# Patient Record
Sex: MALE | Race: WHITE | NOT HISPANIC OR LATINO | Employment: STUDENT | URBAN - METROPOLITAN AREA
[De-identification: names, ages, dates, MRNs, and addresses within clinical notes are randomized per-mention and may not be internally consistent; named-entity substitution may affect disease eponyms.]

---
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
## 2022-02-11 ENCOUNTER — HOSPITAL ENCOUNTER (OUTPATIENT)
Facility: HOSPITAL | Age: 22
Discharge: HOME | End: 2022-02-12
Attending: FAMILY MEDICINE | Admitting: INTERNAL MEDICINE
Payer: MEDICAID

## 2022-02-11 DIAGNOSIS — J90 PLEURAL EFFUSION: ICD-10-CM

## 2022-02-11 DIAGNOSIS — R07.9 CHEST PAIN: ICD-10-CM

## 2022-02-11 DIAGNOSIS — J90 PLEURAL EFFUSION ON RIGHT: Primary | ICD-10-CM

## 2022-02-11 LAB
ALBUMIN SERPL BCP-MCNC: 4 G/DL (ref 3.5–5.2)
ALP SERPL-CCNC: 71 U/L (ref 55–135)
ALT SERPL W/O P-5'-P-CCNC: 22 U/L (ref 10–44)
ANION GAP SERPL CALC-SCNC: 10 MMOL/L (ref 8–16)
AST SERPL-CCNC: 27 U/L (ref 10–40)
BASOPHILS # BLD AUTO: 0.07 K/UL (ref 0–0.2)
BASOPHILS NFR BLD: 0.7 % (ref 0–1.9)
BILIRUB SERPL-MCNC: 0.6 MG/DL (ref 0.1–1)
BUN SERPL-MCNC: 17 MG/DL (ref 6–20)
CALCIUM SERPL-MCNC: 9.6 MG/DL (ref 8.7–10.5)
CHLORIDE SERPL-SCNC: 105 MMOL/L (ref 95–110)
CO2 SERPL-SCNC: 26 MMOL/L (ref 23–29)
CREAT SERPL-MCNC: 1.3 MG/DL (ref 0.5–1.4)
DIFFERENTIAL METHOD: ABNORMAL
EOSINOPHIL # BLD AUTO: 0.8 K/UL (ref 0–0.5)
EOSINOPHIL NFR BLD: 8.1 % (ref 0–8)
ERYTHROCYTE [DISTWIDTH] IN BLOOD BY AUTOMATED COUNT: 12.6 % (ref 11.5–14.5)
EST. GFR  (AFRICAN AMERICAN): >60 ML/MIN/1.73 M^2
EST. GFR  (NON AFRICAN AMERICAN): >60 ML/MIN/1.73 M^2
GLUCOSE SERPL-MCNC: 73 MG/DL (ref 70–110)
HCT VFR BLD AUTO: 41.4 % (ref 40–54)
HGB BLD-MCNC: 13.6 G/DL (ref 14–18)
IMM GRANULOCYTES # BLD AUTO: 0.03 K/UL (ref 0–0.04)
IMM GRANULOCYTES NFR BLD AUTO: 0.3 % (ref 0–0.5)
LYMPHOCYTES # BLD AUTO: 2.7 K/UL (ref 1–4.8)
LYMPHOCYTES NFR BLD: 26 % (ref 18–48)
MCH RBC QN AUTO: 27.6 PG (ref 27–31)
MCHC RBC AUTO-ENTMCNC: 32.9 G/DL (ref 32–36)
MCV RBC AUTO: 84 FL (ref 82–98)
MONOCYTES # BLD AUTO: 0.9 K/UL (ref 0.3–1)
MONOCYTES NFR BLD: 8.5 % (ref 4–15)
NEUTROPHILS # BLD AUTO: 5.8 K/UL (ref 1.8–7.7)
NEUTROPHILS NFR BLD: 56.4 % (ref 38–73)
NRBC BLD-RTO: 0 /100 WBC
PLATELET # BLD AUTO: 427 K/UL (ref 150–450)
PMV BLD AUTO: 8.2 FL (ref 9.2–12.9)
POTASSIUM SERPL-SCNC: 3.9 MMOL/L (ref 3.5–5.1)
PROT SERPL-MCNC: 7.7 G/DL (ref 6–8.4)
RBC # BLD AUTO: 4.92 M/UL (ref 4.6–6.2)
SARS-COV-2 RDRP RESP QL NAA+PROBE: NEGATIVE
SODIUM SERPL-SCNC: 141 MMOL/L (ref 136–145)
WBC # BLD AUTO: 10.24 K/UL (ref 3.9–12.7)

## 2022-02-11 PROCEDURE — G0378 HOSPITAL OBSERVATION PER HR: HCPCS

## 2022-02-11 PROCEDURE — U0002 COVID-19 LAB TEST NON-CDC: HCPCS | Performed by: FAMILY MEDICINE

## 2022-02-11 PROCEDURE — 99291 CRITICAL CARE FIRST HOUR: CPT | Mod: 25

## 2022-02-11 PROCEDURE — 85025 COMPLETE CBC W/AUTO DIFF WBC: CPT | Performed by: NURSE PRACTITIONER

## 2022-02-11 PROCEDURE — 80053 COMPREHEN METABOLIC PANEL: CPT | Performed by: NURSE PRACTITIONER

## 2022-02-11 RX ORDER — LISDEXAMFETAMINE DIMESYLATE 30 MG/1
30 CAPSULE ORAL EVERY MORNING
COMMUNITY
Start: 2022-01-15

## 2022-02-12 VITALS
RESPIRATION RATE: 18 BRPM | OXYGEN SATURATION: 93 % | WEIGHT: 270.5 LBS | BODY MASS INDEX: 36.64 KG/M2 | HEART RATE: 64 BPM | DIASTOLIC BLOOD PRESSURE: 52 MMHG | HEIGHT: 72 IN | TEMPERATURE: 98 F | SYSTOLIC BLOOD PRESSURE: 98 MMHG

## 2022-02-12 PROBLEM — J90 PLEURAL EFFUSION ON RIGHT: Status: ACTIVE | Noted: 2022-02-12

## 2022-02-12 PROBLEM — R29.818 SUSPECTED SLEEP APNEA: Status: ACTIVE | Noted: 2022-02-12

## 2022-02-12 PROBLEM — J90 PLEURAL EFFUSION ON RIGHT: Status: RESOLVED | Noted: 2022-02-12 | Resolved: 2022-02-12

## 2022-02-12 LAB
AMPHET+METHAMPHET UR QL: ABNORMAL
BARBITURATES UR QL SCN>200 NG/ML: NEGATIVE
BENZODIAZ UR QL SCN>200 NG/ML: NEGATIVE
BILIRUB UR QL STRIP: NEGATIVE
BZE UR QL SCN: NEGATIVE
CANNABINOIDS UR QL SCN: NEGATIVE
CLARITY UR: CLEAR
COLOR UR: YELLOW
CREAT UR-MCNC: 293.5 MG/DL (ref 23–375)
CRP SERPL-MCNC: 17.7 MG/L (ref 0–8.2)
ERYTHROCYTE [SEDIMENTATION RATE] IN BLOOD BY WESTERGREN METHOD: 24 MM/HR (ref 0–10)
GLUCOSE UR QL STRIP: NEGATIVE
HGB UR QL STRIP: NEGATIVE
KETONES UR QL STRIP: NEGATIVE
LDH SERPL L TO P-CCNC: 221 U/L (ref 110–260)
LEUKOCYTE ESTERASE UR QL STRIP: NEGATIVE
METHADONE UR QL SCN>300 NG/ML: NEGATIVE
NITRITE UR QL STRIP: NEGATIVE
OPIATES UR QL SCN: NEGATIVE
PCP UR QL SCN>25 NG/ML: NEGATIVE
PH UR STRIP: 6 [PH] (ref 5–8)
PROT SERPL-MCNC: 7.9 G/DL (ref 6–8.4)
PROT UR QL STRIP: NEGATIVE
SP GR UR STRIP: >=1.03 (ref 1–1.03)
TOXICOLOGY INFORMATION: ABNORMAL
URN SPEC COLLECT METH UR: ABNORMAL
UROBILINOGEN UR STRIP-ACNC: NEGATIVE EU/DL

## 2022-02-12 PROCEDURE — 88305 TISSUE EXAM BY PATHOLOGIST: ICD-10-PCS | Mod: 26,,, | Performed by: PATHOLOGY

## 2022-02-12 PROCEDURE — 82042 OTHER SOURCE ALBUMIN QUAN EA: CPT | Performed by: INTERNAL MEDICINE

## 2022-02-12 PROCEDURE — 84157 ASSAY OF PROTEIN OTHER: CPT | Performed by: INTERNAL MEDICINE

## 2022-02-12 PROCEDURE — 89051 BODY FLUID CELL COUNT: CPT | Performed by: INTERNAL MEDICINE

## 2022-02-12 PROCEDURE — 99204 OFFICE O/P NEW MOD 45 MIN: CPT | Mod: 25,,, | Performed by: INTERNAL MEDICINE

## 2022-02-12 PROCEDURE — 88112 CYTOPATH CELL ENHANCE TECH: CPT | Mod: 26,,, | Performed by: PATHOLOGY

## 2022-02-12 PROCEDURE — 63600175 PHARM REV CODE 636 W HCPCS: Performed by: INTERNAL MEDICINE

## 2022-02-12 PROCEDURE — 87206 SMEAR FLUORESCENT/ACID STAI: CPT | Performed by: INTERNAL MEDICINE

## 2022-02-12 PROCEDURE — 32555 PR THORACEN W/IMAG GUIDANCE: ICD-10-PCS | Mod: RT,,, | Performed by: INTERNAL MEDICINE

## 2022-02-12 PROCEDURE — 87205 SMEAR GRAM STAIN: CPT | Performed by: INTERNAL MEDICINE

## 2022-02-12 PROCEDURE — 96374 THER/PROPH/DIAG INJ IV PUSH: CPT

## 2022-02-12 PROCEDURE — 88305 TISSUE EXAM BY PATHOLOGIST: CPT | Mod: 26,,, | Performed by: PATHOLOGY

## 2022-02-12 PROCEDURE — 25000003 PHARM REV CODE 250: Performed by: INTERNAL MEDICINE

## 2022-02-12 PROCEDURE — 99204 PR OFFICE/OUTPT VISIT, NEW, LEVL IV, 45-59 MIN: ICD-10-PCS | Mod: 25,,, | Performed by: INTERNAL MEDICINE

## 2022-02-12 PROCEDURE — 86038 ANTINUCLEAR ANTIBODIES: CPT | Performed by: INTERNAL MEDICINE

## 2022-02-12 PROCEDURE — 85651 RBC SED RATE NONAUTOMATED: CPT | Performed by: INTERNAL MEDICINE

## 2022-02-12 PROCEDURE — G0378 HOSPITAL OBSERVATION PER HR: HCPCS

## 2022-02-12 PROCEDURE — 83615 LACTATE (LD) (LDH) ENZYME: CPT | Performed by: INTERNAL MEDICINE

## 2022-02-12 PROCEDURE — 88305 TISSUE EXAM BY PATHOLOGIST: CPT | Performed by: PATHOLOGY

## 2022-02-12 PROCEDURE — 83615 LACTATE (LD) (LDH) ENZYME: CPT | Mod: 91 | Performed by: INTERNAL MEDICINE

## 2022-02-12 PROCEDURE — 82150 ASSAY OF AMYLASE: CPT | Performed by: INTERNAL MEDICINE

## 2022-02-12 PROCEDURE — 84311 SPECTROPHOTOMETRY: CPT | Performed by: INTERNAL MEDICINE

## 2022-02-12 PROCEDURE — 86431 RHEUMATOID FACTOR QUANT: CPT | Performed by: INTERNAL MEDICINE

## 2022-02-12 PROCEDURE — 80307 DRUG TEST PRSMV CHEM ANLYZR: CPT | Performed by: NURSE PRACTITIONER

## 2022-02-12 PROCEDURE — 81003 URINALYSIS AUTO W/O SCOPE: CPT | Mod: 59 | Performed by: NURSE PRACTITIONER

## 2022-02-12 PROCEDURE — 84155 ASSAY OF PROTEIN SERUM: CPT | Performed by: INTERNAL MEDICINE

## 2022-02-12 PROCEDURE — 32555 ASPIRATE PLEURA W/ IMAGING: CPT | Mod: RT,,, | Performed by: INTERNAL MEDICINE

## 2022-02-12 PROCEDURE — 86140 C-REACTIVE PROTEIN: CPT | Performed by: INTERNAL MEDICINE

## 2022-02-12 PROCEDURE — 88112 CYTOPATH CELL ENHANCE TECH: CPT | Performed by: PATHOLOGY

## 2022-02-12 PROCEDURE — 87116 MYCOBACTERIA CULTURE: CPT | Performed by: INTERNAL MEDICINE

## 2022-02-12 PROCEDURE — 88112 PR  CYTOPATH, CELL ENHANCE TECH: ICD-10-PCS | Mod: 26,,, | Performed by: PATHOLOGY

## 2022-02-12 PROCEDURE — 82945 GLUCOSE OTHER FLUID: CPT | Performed by: INTERNAL MEDICINE

## 2022-02-12 PROCEDURE — 32555 ASPIRATE PLEURA W/ IMAGING: CPT | Mod: RT

## 2022-02-12 RX ORDER — LIDOCAINE HYDROCHLORIDE 10 MG/ML
5 INJECTION, SOLUTION EPIDURAL; INFILTRATION; INTRACAUDAL; PERINEURAL ONCE
Status: COMPLETED | OUTPATIENT
Start: 2022-02-12 | End: 2022-02-12

## 2022-02-12 RX ORDER — ONDANSETRON 8 MG/1
8 TABLET, ORALLY DISINTEGRATING ORAL EVERY 8 HOURS PRN
Status: DISCONTINUED | OUTPATIENT
Start: 2022-02-12 | End: 2022-02-12 | Stop reason: HOSPADM

## 2022-02-12 RX ORDER — LEVOFLOXACIN 5 MG/ML
750 INJECTION, SOLUTION INTRAVENOUS
Status: DISCONTINUED | OUTPATIENT
Start: 2022-02-12 | End: 2022-02-12 | Stop reason: HOSPADM

## 2022-02-12 RX ORDER — TALC
9 POWDER (GRAM) TOPICAL NIGHTLY PRN
Status: DISCONTINUED | OUTPATIENT
Start: 2022-02-12 | End: 2022-02-12 | Stop reason: HOSPADM

## 2022-02-12 RX ORDER — HYDROCODONE BITARTRATE AND ACETAMINOPHEN 5; 325 MG/1; MG/1
1 TABLET ORAL EVERY 6 HOURS PRN
Status: DISCONTINUED | OUTPATIENT
Start: 2022-02-12 | End: 2022-02-12 | Stop reason: HOSPADM

## 2022-02-12 RX ORDER — ACETAMINOPHEN 325 MG/1
650 TABLET ORAL EVERY 6 HOURS PRN
Status: DISCONTINUED | OUTPATIENT
Start: 2022-02-12 | End: 2022-02-12 | Stop reason: HOSPADM

## 2022-02-12 RX ORDER — PROMETHAZINE HYDROCHLORIDE 12.5 MG/1
25 TABLET ORAL EVERY 6 HOURS PRN
Status: DISCONTINUED | OUTPATIENT
Start: 2022-02-12 | End: 2022-02-12 | Stop reason: HOSPADM

## 2022-02-12 RX ORDER — NALOXONE HCL 0.4 MG/ML
0.02 VIAL (ML) INJECTION
Status: DISCONTINUED | OUTPATIENT
Start: 2022-02-12 | End: 2022-02-12 | Stop reason: HOSPADM

## 2022-02-12 RX ORDER — SODIUM CHLORIDE 9 MG/ML
INJECTION, SOLUTION INTRAVENOUS
Status: DISCONTINUED | OUTPATIENT
Start: 2022-02-12 | End: 2022-02-12 | Stop reason: HOSPADM

## 2022-02-12 RX ORDER — LEVOFLOXACIN 500 MG/1
500 TABLET, FILM COATED ORAL DAILY
Qty: 10 TABLET | Refills: 0 | Status: SHIPPED | OUTPATIENT
Start: 2022-02-12 | End: 2022-02-22

## 2022-02-12 RX ORDER — DIPHENHYDRAMINE HCL 25 MG
25 CAPSULE ORAL EVERY 6 HOURS PRN
Status: DISCONTINUED | OUTPATIENT
Start: 2022-02-12 | End: 2022-02-12 | Stop reason: HOSPADM

## 2022-02-12 RX ORDER — SODIUM CHLORIDE 0.9 % (FLUSH) 0.9 %
10 SYRINGE (ML) INJECTION
Status: DISCONTINUED | OUTPATIENT
Start: 2022-02-12 | End: 2022-02-12 | Stop reason: HOSPADM

## 2022-02-12 RX ORDER — SODIUM CHLORIDE 0.9 % (FLUSH) 0.9 %
10 SYRINGE (ML) INJECTION EVERY 12 HOURS PRN
Status: DISCONTINUED | OUTPATIENT
Start: 2022-02-12 | End: 2022-02-12 | Stop reason: HOSPADM

## 2022-02-12 RX ORDER — HYDROCODONE BITARTRATE AND ACETAMINOPHEN 10; 325 MG/1; MG/1
1 TABLET ORAL EVERY 6 HOURS PRN
Status: DISCONTINUED | OUTPATIENT
Start: 2022-02-12 | End: 2022-02-12 | Stop reason: HOSPADM

## 2022-02-12 RX ORDER — GUAIFENESIN 100 MG/5ML
200 SOLUTION ORAL EVERY 4 HOURS PRN
Status: DISCONTINUED | OUTPATIENT
Start: 2022-02-12 | End: 2022-02-12 | Stop reason: HOSPADM

## 2022-02-12 RX ORDER — IPRATROPIUM BROMIDE AND ALBUTEROL SULFATE 2.5; .5 MG/3ML; MG/3ML
3 SOLUTION RESPIRATORY (INHALATION) EVERY 4 HOURS PRN
Status: DISCONTINUED | OUTPATIENT
Start: 2022-02-12 | End: 2022-02-12 | Stop reason: HOSPADM

## 2022-02-12 RX ORDER — LEVOFLOXACIN 750 MG/1
750 TABLET ORAL DAILY
Qty: 5 TABLET | Refills: 0 | Status: SHIPPED | OUTPATIENT
Start: 2022-02-12 | End: 2022-02-12 | Stop reason: HOSPADM

## 2022-02-12 RX ORDER — MAG HYDROX/ALUMINUM HYD/SIMETH 200-200-20
30 SUSPENSION, ORAL (FINAL DOSE FORM) ORAL 4 TIMES DAILY PRN
Status: DISCONTINUED | OUTPATIENT
Start: 2022-02-12 | End: 2022-02-12 | Stop reason: HOSPADM

## 2022-02-12 RX ADMIN — LIDOCAINE HYDROCHLORIDE 50 MG: 10 INJECTION, SOLUTION EPIDURAL; INFILTRATION; INTRACAUDAL; PERINEURAL at 10:02

## 2022-02-12 RX ADMIN — LEVOFLOXACIN 750 MG: 750 INJECTION, SOLUTION INTRAVENOUS at 11:02

## 2022-02-12 RX ADMIN — SODIUM CHLORIDE: 0.9 INJECTION, SOLUTION INTRAVENOUS at 11:02

## 2022-02-12 NOTE — HPI
"Maciel Miranda is a 22 y.o. male with a PMHx only significant for ADHD, who presented to the Emergency Department with c/o right-sided pleurisy which onset gradually 2 months ago. Pt describes being SOB and having a sharp pain in his chest that has initially stayed the same in severity until 1 month ago when it started to worsen. Pt states that he feels like he is suffocating when he lies down and has a "gurgling effect" when he takes a deep breath. Pt was seen at [facility] today who did a CXR and a Chest CT which showed: Moderate consolidation involving the basal right lower lung with obscuration of the right heart margin and mild to moderate right pleural effusion. Pt denies any trauma and is a casual weight . Symptoms are constant and moderate in severity. No other mitigating or exacerbating factors reported. Associated sxs include dry cough. Denies any constitutional symptoms, no fever, weight loss, sweats or travel out of country. No wheezing, congestion, sore throat, orthopnea, PND, edema, ABD pain, N/V/D, back pain, lightheadedness, dizziness, weakness, or chills. Initial work-up in the ED was benign with stable labs and VS. Repeat CXR showed small right pleural effusion or scarring, no left pleural fluid, otherwise the lungs are clear. Case was discussed with Pulmonology per the ED, who plans on performing diagnostic thoracentesis. Hospital Medicine was contacted for admission and patient placed in Observation.   "

## 2022-02-12 NOTE — SUBJECTIVE & OBJECTIVE
Past Medical History:   Diagnosis Date    ADHD (attention deficit hyperactivity disorder)     primarily inattentive       History reviewed. No pertinent surgical history.    Review of patient's allergies indicates:  No Known Allergies    No current facility-administered medications on file prior to encounter.     Current Outpatient Medications on File Prior to Encounter   Medication Sig    tretinoin (RETIN-A) 0.025 % gel Apply topically nightly.    VYVANSE 30 mg capsule Take 30 mg by mouth every morning.     Family History     Problem Relation (Age of Onset)    Cancer Paternal Grandfather        Tobacco Use    Smoking status: Former Smoker    Smokeless tobacco: Former User   Substance and Sexual Activity    Alcohol use: No    Drug use: No    Sexual activity: Never     Review of Systems   Constitutional: Negative for chills, diaphoresis, fatigue and fever.   HENT: Negative for congestion, postnasal drip, rhinorrhea, sinus pressure and sore throat.    Respiratory: Positive for cough and shortness of breath. Negative for wheezing.    Cardiovascular: Positive for chest pain. Negative for palpitations and leg swelling.   Gastrointestinal: Negative for abdominal pain, diarrhea, nausea and vomiting.   Musculoskeletal: Negative for arthralgias, back pain and myalgias.   Skin: Negative for pallor and rash.   Neurological: Negative for dizziness, weakness, light-headedness and headaches.   All other systems reviewed and are negative.    Objective:     Vital Signs (Most Recent):  Temp: 97.6 °F (36.4 °C) (02/11/22 1931)  Pulse: 75 (02/12/22 0100)  Resp: (!) 25 (02/12/22 0100)  BP: 118/64 (02/12/22 0100)  SpO2: (!) 94 % (02/12/22 0100) Vital Signs (24h Range):  Temp:  [97.6 °F (36.4 °C)] 97.6 °F (36.4 °C)  Pulse:  [] 75  Resp:  [16-25] 25  SpO2:  [94 %-99 %] 94 %  BP: (116-139)/(55-82) 118/64     Weight: 122.7 kg (270 lb 8.1 oz)  Body mass index is 36.69 kg/m².    Physical Exam  Vitals and nursing note reviewed.    Constitutional:       General: He is awake. He is not in acute distress.     Appearance: Normal appearance. He is well-developed. He is not diaphoretic.   HENT:      Head: Normocephalic and atraumatic.   Eyes:      Conjunctiva/sclera: Conjunctivae normal.   Neck:      Vascular: No JVD.   Cardiovascular:      Rate and Rhythm: Normal rate and regular rhythm.  No extrasystoles are present.     Heart sounds: S1 normal and S2 normal. No murmur heard.  No friction rub. No gallop.    Pulmonary:      Effort: Pulmonary effort is normal. Tachypnea present. No accessory muscle usage or respiratory distress.      Breath sounds: Normal air entry. Examination of the right-lower field reveals decreased breath sounds. Decreased breath sounds present. No wheezing, rhonchi or rales.   Abdominal:      General: Bowel sounds are normal. There is no distension.      Palpations: Abdomen is soft.      Tenderness: There is no abdominal tenderness.   Musculoskeletal:         General: No tenderness or deformity. Normal range of motion.      Cervical back: Normal range of motion and neck supple.      Right lower leg: No edema.      Left lower leg: No edema.   Skin:     General: Skin is warm and dry.      Capillary Refill: Capillary refill takes less than 2 seconds.      Findings: No erythema or rash.   Neurological:      General: No focal deficit present.      Mental Status: He is alert and oriented to person, place, and time.   Psychiatric:         Mood and Affect: Mood and affect normal.         Behavior: Behavior normal. Behavior is cooperative.             Significant Labs:  Results for orders placed or performed during the hospital encounter of 02/11/22   CBC auto differential   Result Value Ref Range    WBC 10.24 3.90 - 12.70 K/uL    RBC 4.92 4.60 - 6.20 M/uL    Hemoglobin 13.6 (L) 14.0 - 18.0 g/dL    Hematocrit 41.4 40.0 - 54.0 %    MCV 84 82 - 98 fL    MCH 27.6 27.0 - 31.0 pg    MCHC 32.9 32.0 - 36.0 g/dL    RDW 12.6 11.5 - 14.5 %     Platelets 427 150 - 450 K/uL    MPV 8.2 (L) 9.2 - 12.9 fL    Immature Granulocytes 0.3 0.0 - 0.5 %    Gran # (ANC) 5.8 1.8 - 7.7 K/uL    Immature Grans (Abs) 0.03 0.00 - 0.04 K/uL    Lymph # 2.7 1.0 - 4.8 K/uL    Mono # 0.9 0.3 - 1.0 K/uL    Eos # 0.8 (H) 0.0 - 0.5 K/uL    Baso # 0.07 0.00 - 0.20 K/uL    nRBC 0 0 /100 WBC    Gran % 56.4 38.0 - 73.0 %    Lymph % 26.0 18.0 - 48.0 %    Mono % 8.5 4.0 - 15.0 %    Eosinophil % 8.1 (H) 0.0 - 8.0 %    Basophil % 0.7 0.0 - 1.9 %    Differential Method Automated    Comprehensive metabolic panel   Result Value Ref Range    Sodium 141 136 - 145 mmol/L    Potassium 3.9 3.5 - 5.1 mmol/L    Chloride 105 95 - 110 mmol/L    CO2 26 23 - 29 mmol/L    Glucose 73 70 - 110 mg/dL    BUN 17 6 - 20 mg/dL    Creatinine 1.3 0.5 - 1.4 mg/dL    Calcium 9.6 8.7 - 10.5 mg/dL    Total Protein 7.7 6.0 - 8.4 g/dL    Albumin 4.0 3.5 - 5.2 g/dL    Total Bilirubin 0.6 0.1 - 1.0 mg/dL    Alkaline Phosphatase 71 55 - 135 U/L    AST 27 10 - 40 U/L    ALT 22 10 - 44 U/L    Anion Gap 10 8 - 16 mmol/L    eGFR if African American >60 >60 mL/min/1.73 m^2    eGFR if non African American >60 >60 mL/min/1.73 m^2   COVID-19 Rapid Screening   Result Value Ref Range    SARS-CoV-2 RNA, Amplification, Qual Negative Negative      All pertinent labs within the past 24 hours have been reviewed.    Significant Imaging:  Imaging Results          X-Ray Chest PA And Lateral (Final result)  Result time 02/11/22 21:03:28    Final result by Kyle Marte MD (02/11/22 21:03:28)                 Impression:      As above.      Electronically signed by: Kyle Marte  Date:    02/11/2022  Time:    21:03             Narrative:    EXAMINATION:  XR CHEST PA AND LATERAL    CLINICAL HISTORY:  Chest pain, unspecified    TECHNIQUE:  PA and lateral views of the chest were performed.    COMPARISON:  None    FINDINGS:  Small right pleural effusion or scarring.  No left pleural fluid.  Otherwise the lungs are clear.    The cardiac  silhouette is normal in size. The hilar and mediastinal contours are unremarkable.    Bones are intact.                               I have reviewed all pertinent imaging results/findings within the past 24 hours.

## 2022-02-12 NOTE — ASSESSMENT & PLAN NOTE
- Pulmonology consult. Plans for diagnostic thoracentesis today.  - O2 sat stable on RA. Oxygen supplementation as needed to keep sat >92%.

## 2022-02-12 NOTE — ASSESSMENT & PLAN NOTE
Complications of the procedure discussed in detail with patient. Complications including but not limited to infection that may require hospital admission, bleeding that may require blood transfusion and or hospital admission, perforation of the lung which may require surgery. Patient expressed and verbalized understanding. Alternate treatments and material risks associated with such alternatives were discussed with pateint. These include radiologic surveillance with minimal risk and sugery with an indeterminate risk. The material risks of refusing the procedure was discussed in detail. This includes no diagnosis or confirmation of diagnisis and rendering of appropriate treatment the risk of which depends on the nature of the diagnosed illness. Patient expressed and verbalized understanding. Pleural fluid will be sent for chenistry, microbiology and cytology.

## 2022-02-12 NOTE — HOSPITAL COURSE
2/12   Admitted overnight for pleural effusion. Pulm consulted for thoracentesis. Patient tolerated procedure well. After discussing with pulm, ok to dc with po abx.     Patient seen and evaluated by me. Patient was determined to be suitable for d/c. Patient deemed stable for discharge to home with po abx.    Attempted to call [facility] pharmacy to change duration of abx. Resent rx for 10 days levaquin    Called patient to notify change in duration of abx to 10 days.

## 2022-02-12 NOTE — NURSING
Discharge instructions received and reviewed with pt and family at bedside.  Pt voiced understanding and all questions answered to satisfaction.  Stressed importance to making and keeping all follow up appointments.  Medications sent to pt pharmacy and reviewed with pt.  Tele monitor removed and brought to monitor tech.  IV d/c'd with tip intact, pressure dressing applied.  Pt dressing and awaiting ride.

## 2022-02-12 NOTE — PROCEDURES
"Maciel Miranda is a 22 y.o. male patient.    Temp: 97.6 °F (36.4 °C) (22)  Pulse: 71 (22 0900)  Resp: 19 (22)  BP: 128/67 (22)  SpO2: 95 % (22)  Weight: 122.7 kg (270 lb 8.1 oz) (22)  Height: 6' (182.9 cm) (22)       Thoracentesis    Date/Time: 2022 10:31 AM  Location procedure was performed: [facility] PULMON[facility]IC[facility]  Performed by: Mak Mueller MD  Authorized by: Mak Mueller MD   Pre-operative diagnosis: Right Pleural effusion  Post-operative diagnosis: SAME  Consent Done: Yes  Consent: Written consent obtained.  Risks and benefits: risks, benefits and alternatives were discussed  Consent given by: patient  Patient understanding: patient states understanding of the procedure being performed  Patient consent: the patient's understanding of the procedure matches consent given  Procedure consent: procedure consent matches procedure scheduled  Relevant documents: relevant documents present and verified  Test results: test results available and properly labeled  Site marked: the operative site was marked  Imaging studies: imaging studies available  Required items: required blood products, implants, devices, and special equipment available  Patient identity confirmed: , MRN, name, provided demographic data and verbally with patient  Time out: Immediately prior to procedure a "time out" was called to verify the correct patient, procedure, equipment, support staff and site/side marked as required.  Procedure purpose: diagnostic  Indications: pleural effusion  Preparation: Patient was prepped and draped in the usual sterile fashion.  Local anesthesia used: yes  Anesthesia: local infiltration and see MAR for details    Anesthesia:  Local anesthesia used: yes  Local Anesthetic: lidocaine 1% without epinephrine (infiltrated 2 sites)  Anesthetic total: 20 mL  Preparation: skin prepped with ChloraPrep  Patient position: " sitting  Ultrasound guidance: yes  Location: right posterior  Intercostal space: 8th  Puncture method: over-the-needle catheter  Needle size: 18  Catheter size: 8 Chinese  Number of attempts: 2 (1st marking was dry, remarked 4 cm lateral )  Drainage amount: 150 ml  Drainage characteristics: serosanguinous  Patient tolerance: Patient tolerated the procedure well with no immediate complications  Chest x-ray performed: yes  Chest x-ray interpreted by me.  Chest x-ray findings: normal findings  Complications: No  Specimens: Yes  Implants: No  Comments: Tube #1: Gram stain, culture, KOH, AFB, Tube #2: Chemistry: Alb, Ruby, Chol, LDH, Prot, Tube #3: Cytology, Lavender for wbcc, Urine specimen cup for cytology   Drainage Tube: removed    Post procedure CXR No Pneumo    2/12/2022

## 2022-02-12 NOTE — CONSULTS
[facility] - Emergency Dept.  Pulmonology  Consult Note    Patient Name: Maciel Miranda  MRN: [id number]  Admission Date: 2/11/2022  Hospital Length of Stay: 0 days  Code Status: Full Code  Attending Physician: Tina Romero MD  Primary Care Provider: Primary Doctor No   Principal Problem: Pleural effusion on right    [unfilled]  Subjective:     HPI:  Maciel Miranda is 22 y.o.  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 No pertinent surgical history.    Review of patient's allergies indicates:  No Known Allergies    Family History     Problem Relation (Age of Onset)    Cancer Paternal Grandfather        Tobacco Use    Smoking status: Former Smoker    Smokeless tobacco: Former User   Substance and Sexual Activity    Alcohol use: No    Drug use: No    Sexual activity: Never         Review of Systems   Constitutional: Positive for fatigue.   Respiratory: Positive for apnea.    Cardiovascular: Positive for leg swelling.   All other systems reviewed and are negative.    Objective:     Vital Signs (Most Recent):  Temp: 97.6 °F (36.4 °C) (02/11/22 1931)  Pulse: 68 (02/12/22 0618)  Resp: 18 (02/12/22 0618)  BP: 115/61 (02/12/22 0602)  SpO2: (!) 93 % (02/12/22 0618) Vital Signs (24h Range):  Temp:  [97.6 °F (36.4 °C)] 97.6 °F (36.4 °C)  Pulse:  [] 68  Resp:  [16-25] 18  SpO2:  [88 %-99 %] 93 %  BP: (101-143)/(50-82) 115/61     Weight: 122.7 kg (270 lb 8.1 oz)  Body mass index is  36.69 kg/m².      Intake/Output Summary (Last 24 hours) at 2/12/2022 0719  Last data filed at 2/12/2022 0610  Gross per 24 hour   Intake --   Output 200 ml   Net -200 ml       Physical Exam  Vitals and nursing note reviewed.   Constitutional:       Appearance: He is well-developed and well-nourished.   HENT:      Head: Normocephalic and atraumatic.      Nose: Nose normal.      Mouth/Throat:      Mouth: Oropharynx is clear and moist.   Eyes:      Extraocular Movements: EOM normal.      Pupils: Pupils are equal, round, and reactive to light.   Cardiovascular:      Rate and Rhythm: Normal rate and regular rhythm.      Heart sounds: Normal heart sounds.   Pulmonary:      Effort: Pulmonary effort is normal.      Breath sounds: Examination of the right-lower field reveals decreased breath sounds. Decreased breath sounds present.       Abdominal:      General: Bowel sounds are normal.      Palpations: Abdomen is soft.   Musculoskeletal:         General: No edema. Normal range of motion.      Cervical back: Normal range of motion and neck supple.   Skin:     General: Skin is warm and dry.      Capillary Refill: Capillary refill takes 2 to 3 seconds.   Neurological:      Mental Status: He is alert and oriented to person, place, and time.      Cranial Nerves: No cranial nerve deficit.   Psychiatric:         Mood and Affect: Mood and affect normal.         Vents:       Lines/Drains/Airways     Peripheral Intravenous Line                 Peripheral IV - Single Lumen 02/11/22 2159 20 G Right Antecubital <1 day                Significant Labs:    CBC/Anemia Profile:  Recent Labs   Lab 02/11/22 2158   WBC 10.24   HGB 13.6*   HCT 41.4      MCV 84   RDW 12.6        Chemistries:  Recent Labs   Lab 02/11/22  2158      K 3.9      CO2 26   BUN 17   CREATININE 1.3   CALCIUM 9.6   ALBUMIN 4.0   PROT 7.7   BILITOT 0.6   ALKPHOS 71   ALT 22   AST 27       ABGs: No results for input(s): PH, PCO2, HCO3, POCSATURATED, BE in  the last 48 hours.  All pertinent labs within the past 24 hours have been reviewed.    Significant Imaging:   I have reviewed all pertinent imaging results/findings within the past 24 hours.     X-Ray Chest PA And Lateral  Narrative: EXAMINATION:  XR CHEST PA AND LATERAL    CLINICAL HISTORY:  Chest pain, unspecified    TECHNIQUE:  PA and lateral views of the chest were performed.    COMPARISON:  None    FINDINGS:  Small right pleural effusion or scarring.  No left pleural fluid.  Otherwise the lungs are clear.    The cardiac silhouette is normal in size. The hilar and mediastinal contours are unremarkable.    Bones are intact.  Impression: As above.    Electronically signed by: Kyle Marte  Date:    02/11/2022  Time:    21:03         ABG  No results for input(s): PH, PO2, PCO2, HCO3, BE in the last 168 hours.  Assessment/Plan:     * Pleural effusion on right    Complications of the procedure discussed in detail with patient. Complications including but not limited to infection that may require hospital admission, bleeding that may require blood transfusion and or hospital admission, perforation of the lung which may require surgery. Patient expressed and verbalized understanding. Alternate treatments and material risks associated with such alternatives were discussed with pateint. These include radiologic surveillance with minimal risk and sugery with an indeterminate risk. The material risks of refusing the procedure was discussed in detail. This includes no diagnosis or confirmation of diagnisis and rendering of appropriate treatment the risk of which depends on the nature of the diagnosed illness. Patient expressed and verbalized understanding. Pleural fluid will be sent for chenistry, microbiology and cytology.     BMI 36.0-36.9,adult  General weight loss/lifestyle modification strategies discussed (elicit support from others; identify saboteurs; non-food rewards).  Diet interventions: low calorie (1000  kCal/d) deficit diet     Suspected sleep apnea  Needs Sleep study    fabiola at 10 am      Thank you for your consult. I will follow-up with patient. Please contact us if you have any additional questions.     Mak Mueller MD  Pulmonology  [facility] - Emergency Dept.    CT revewied

## 2022-02-12 NOTE — ED PROVIDER NOTES
"SCRIBE #1 NOTE: I, Charbel April, am scribing for, and in the presence of, Makenzie Peters MD. I have scribed the entire note.       History     Chief Complaint   Patient presents with    Pleurisy     Pt CO pain to R side of ribs on deep insp or exertion. Heavy weight . Had CT chest today with abnormalities. Feels SOB     Review of patient's allergies indicates:  No Known Allergies      History of Present Illness     HPI    2/11/2022, 10:21 PM  History obtained from the patient      History of Present Illness: Maciel Miranda is a 22 y.o. male patient who presents to the Emergency Department for evaluation of pleurisy which onset gradually 2 months ago. Pt describes being SOB and having a sharp pain in his chest that has initially stayed the same in severity till 1 month ago when it started to worsen. Pt states that he feels like he is suffocating when he lies down and has a "gurgling effect" when he takes a deep breath. Pt was seen at [facility] today who did a CXR and a Chest CT of him where they found "moderate consolidation involving the basal right lower lung with obscuration of the right heart margin and right pleural effusion." Pt denies any trauma and is a casual weight . Symptoms are constant and moderate in severity. No other mitigating or exacerbating factors reported. Associated sxs include dry cough.. Patient denies any fever, cough, smoking, n/v/d, wheezing, congestion, and all other sxs at this time. No prior Tx reported. No further complaints or concerns at this time.       Arrival mode: Personal vehicle    PCP: Primary Doctor No        Past Medical History:  Past Medical History:   Diagnosis Date    ADHD (attention deficit hyperactivity disorder)     primarily inattentive    Sleep apnea        Past Surgical History:  History reviewed. No pertinent surgical history.      Family History:  Family History   Problem Relation Age of Onset    Cancer Paternal Grandfather  "       Social History:  Social History     Tobacco Use    Smoking status: Former Smoker    Smokeless tobacco: Former User   Substance and Sexual Activity    Alcohol use: No    Drug use: No    Sexual activity: Never        Review of Systems     Review of Systems   Constitutional: Negative for chills and fever.   HENT: Negative for congestion and sore throat.    Respiratory: Positive for cough (nonproductive) and shortness of breath.    Cardiovascular: Positive for chest pain (Sharp).   Gastrointestinal: Negative for diarrhea, nausea and vomiting.   Genitourinary: Negative for dysuria.   Musculoskeletal: Negative for back pain.   Skin: Negative for rash.   Neurological: Negative for weakness.   Hematological: Does not bruise/bleed easily.   All other systems reviewed and are negative.     Physical Exam     Initial Vitals [02/11/22 1931]   BP Pulse Resp Temp SpO2   125/64 106 16 97.6 °F (36.4 °C) 97 %      MAP       --          Physical Exam  Nursing Notes and Vital Signs Reviewed.  Constitutional: Patient is in no acute distress. Well-developed and well-nourished.  Head: Atraumatic. Normocephalic.  Eyes: PERRL. EOM intact. Conjunctivae are not pale. No scleral icterus.  ENT: Mucous membranes are moist. Oropharynx is clear and symmetric.    Neck: Supple. Full ROM. No lymphadenopathy.  Cardiovascular: Regular rate. Regular rhythm. No murmurs, rubs, or gallops. Distal pulses are 2+ and symmetric.  Pulmonary/Chest: Diminished breath sounds in R lung base.   Abdominal: Soft and non-distended.  There is no tenderness.  No rebound, guarding, or rigidity. Good bowel sounds.  Genitourinary: No CVA tenderness  Musculoskeletal: Moves all extremities. No obvious deformities. No edema. No calf tenderness.  Skin: Warm and dry.  Neurological:  Alert, awake, and appropriate.  Normal speech.  No acute focal neurological deficits are appreciated.  Psychiatric: Normal affect. Good eye contact. Appropriate in content.     ED Course    Critical Care    Date/Time: 2/11/2022 11:15 PM  Performed by: Makenzie Peters MD  Authorized by: Makenzie Peters MD   Direct patient critical care time: 14 minutes  Additional history critical care time: 8 minutes  Ordering / reviewing critical care time: 7 minutes  Documentation critical care time: 6 minutes  Consulting other physicians critical care time: 9 minutes  Consult with family critical care time: 1 minutes  Total critical care time (exclusive of procedural time) : 45 minutes  Critical care time was exclusive of separately billable procedures and treating other patients and teaching time.  Critical care was necessary to treat or prevent imminent or life-threatening deterioration of the following conditions: pleural effusion.  Critical care was time spent personally by me on the following activities: blood draw for specimens, development of treatment plan with patient or surrogate, discussions with consultants, interpretation of cardiac output measurements, evaluation of patient's response to treatment, examination of patient, obtaining history from patient or surrogate, ordering and performing treatments and interventions, ordering and review of laboratory studies, ordering and review of radiographic studies, pulse oximetry, re-evaluation of patient's condition and review of old charts.        ED Vital Signs:  Vitals:    02/12/22 0100 02/12/22 0130 02/12/22 0200 02/12/22 0245   BP: 118/64 108/77 113/71    Pulse: 75 98 97 73   Resp: (!) 25 18 20 20   Temp:       TempSrc:       SpO2: (!) 94% (!) 94% (!) 94% (!) 88%   Weight:       Height:        02/12/22 0300 02/12/22 0303 02/12/22 0400 02/12/22 0500   BP: (!) 143/71  (!) 112/57 (!) 101/50   Pulse: 76 103 62 73   Resp: 20 20 20 20   Temp:       TempSrc:       SpO2: (!) 93% (!) 93% (!) 92% (!) 94%   Weight:       Height:        02/12/22 0530 02/12/22 0602 02/12/22 0618 02/12/22 0839   BP: 108/79 115/61  128/67   Pulse: 78 61 68 68   Resp: 18 17 18 19    Temp:    97.6 °F (36.4 °C)   TempSrc:    Oral   SpO2: (!) 94% (!) 92% (!) 93% 95%   Weight:       Height:        02/12/22 0900 02/12/22 1100 02/12/22 1150   BP:   (!) 98/52   Pulse: 71 72 64   Resp:   18   Temp:   98 °F (36.7 °C)   TempSrc:   Oral   SpO2:   (!) 93%   Weight:      Height:          Abnormal Lab Results:  Labs Reviewed   CBC W/ AUTO DIFFERENTIAL - Abnormal; Notable for the following components:       Result Value    Hemoglobin 13.6 (*)     MPV 8.2 (*)     Eos # 0.8 (*)     Eosinophil % 8.1 (*)     All other components within normal limits   URINALYSIS, REFLEX TO URINE CULTURE - Abnormal; Notable for the following components:    Specific Gravity, UA >=1.030 (*)     All other components within normal limits    Narrative:     Specimen Source->Urine   DRUG SCREEN PANEL, URINE EMERGENCY - Abnormal; Notable for the following components:    Amphetamine Screen, Ur Presumptive Positive (*)     All other components within normal limits    Narrative:     Specimen Source->Urine   COMPREHENSIVE METABOLIC PANEL   SARS-COV-2 RNA AMPLIFICATION, QUAL   QUANTIFERON GOLD TB   RHEUMATOID FACTOR        All Lab Results:  Results for orders placed or performed during the hospital encounter of 02/11/22   CBC auto differential   Result Value Ref Range    WBC 10.24 3.90 - 12.70 K/uL    RBC 4.92 4.60 - 6.20 M/uL    Hemoglobin 13.6 (L) 14.0 - 18.0 g/dL    Hematocrit 41.4 40.0 - 54.0 %    MCV 84 82 - 98 fL    MCH 27.6 27.0 - 31.0 pg    MCHC 32.9 32.0 - 36.0 g/dL    RDW 12.6 11.5 - 14.5 %    Platelets 427 150 - 450 K/uL    MPV 8.2 (L) 9.2 - 12.9 fL    Immature Granulocytes 0.3 0.0 - 0.5 %    Gran # (ANC) 5.8 1.8 - 7.7 K/uL    Immature Grans (Abs) 0.03 0.00 - 0.04 K/uL    Lymph # 2.7 1.0 - 4.8 K/uL    Mono # 0.9 0.3 - 1.0 K/uL    Eos # 0.8 (H) 0.0 - 0.5 K/uL    Baso # 0.07 0.00 - 0.20 K/uL    nRBC 0 0 /100 WBC    Gran % 56.4 38.0 - 73.0 %    Lymph % 26.0 18.0 - 48.0 %    Mono % 8.5 4.0 - 15.0 %    Eosinophil % 8.1 (H) 0.0 - 8.0 %     Basophil % 0.7 0.0 - 1.9 %    Differential Method Automated    Comprehensive metabolic panel   Result Value Ref Range    Sodium 141 136 - 145 mmol/L    Potassium 3.9 3.5 - 5.1 mmol/L    Chloride 105 95 - 110 mmol/L    CO2 26 23 - 29 mmol/L    Glucose 73 70 - 110 mg/dL    BUN 17 6 - 20 mg/dL    Creatinine 1.3 0.5 - 1.4 mg/dL    Calcium 9.6 8.7 - 10.5 mg/dL    Total Protein 7.7 6.0 - 8.4 g/dL    Albumin 4.0 3.5 - 5.2 g/dL    Total Bilirubin 0.6 0.1 - 1.0 mg/dL    Alkaline Phosphatase 71 55 - 135 U/L    AST 27 10 - 40 U/L    ALT 22 10 - 44 U/L    Anion Gap 10 8 - 16 mmol/L    eGFR if African American >60 >60 mL/min/1.73 m^2    eGFR if non African American >60 >60 mL/min/1.73 m^2   COVID-19 Rapid Screening   Result Value Ref Range    SARS-CoV-2 RNA, Amplification, Qual Negative Negative   Urinalysis, Reflex to Urine Culture Urine, Clean Catch    Specimen: Urine   Result Value Ref Range    Specimen UA Urine, Clean Catch     Color, UA Yellow Yellow, Straw, Deepti    Appearance, UA Clear Clear    pH, UA 6.0 5.0 - 8.0    Specific Gravity, UA >=1.030 (A) 1.005 - 1.030    Protein, UA Negative Negative    Glucose, UA Negative Negative    Ketones, UA Negative Negative    Bilirubin (UA) Negative Negative    Occult Blood UA Negative Negative    Nitrite, UA Negative Negative    Urobilinogen, UA Negative <2.0 EU/dL    Leukocytes, UA Negative Negative   Drug screen panel, emergency   Result Value Ref Range    Benzodiazepines Negative Negative    Methadone metabolites Negative Negative    Cocaine (Metab.) Negative Negative    Opiate Scrn, Ur Negative Negative    Barbiturate Screen, Ur Negative Negative    Amphetamine Screen, Ur Presumptive Positive (A) Negative    THC Negative Negative    Phencyclidine Negative Negative    Creatinine, Urine 293.5 23.0 - 375.0 mg/dL    Toxicology Information SEE COMMENT    Sedimentation rate   Result Value Ref Range    Sed Rate 24 (H) 0 - 10 mm/Hr   C-reactive protein   Result Value Ref Range    CRP  17.7 (H) 0.0 - 8.2 mg/L   Lactate dehydrogenase   Result Value Ref Range     110 - 260 U/L   Protein, total   Result Value Ref Range    Total Protein 7.9 6.0 - 8.4 g/dL         Imaging Results:  Imaging Results          US Chest Mediastinum (Final result)  Result time 02/12/22 10:33:49    Final result by Ye Colvin MD (02/12/22 10:33:49)                 Impression:      As above      Electronically signed by: Ye Colvin MD  Date:    02/12/2022  Time:    10:33             Narrative:    EXAMINATION:  US CHEST MEDIASTINUM    CLINICAL HISTORY:  pleural effusion, preop thoracentesis;    TECHNIQUE:  One ultrasound image of the right pleural space    COMPARISON:  None    FINDINGS:  This single image of the right pleural space reveals a small to moderate right effusion, with the pleural lining approximately 4 cm below the skin level.                               X-Ray Chest PA And Lateral (Final result)  Result time 02/12/22 08:16:11    Final result by Samantha Allison MD (Timothy) (02/12/22 08:16:11)                 Impression:      Increasing moderate size right pleural effusion.      Electronically signed by: Samantha Allison MD  Date:    02/12/2022  Time:    08:16             Narrative:    EXAMINATION:  XR CHEST PA AND LATERAL    CLINICAL HISTORY  Pleural effusion,    COMPARISON:  Comparison 02/11/2022    FINDINGS:  The heart size is normal.  The lung fields are clear.  No acute cardiopulmonary infiltrative.  Moderate size right pleural effusion.  Effusion is increased in size.                               X-Ray Chest PA And Lateral (Final result)  Result time 02/11/22 21:03:28    Final result by Kyle Marte MD (02/11/22 21:03:28)                 Impression:      As above.      Electronically signed by: Kyle Marte  Date:    02/11/2022  Time:    21:03             Narrative:    EXAMINATION:  XR CHEST PA AND LATERAL    CLINICAL HISTORY:  Chest pain, unspecified    TECHNIQUE:  PA and lateral views of  the chest were performed.    COMPARISON:  None    FINDINGS:  Small right pleural effusion or scarring.  No left pleural fluid.  Otherwise the lungs are clear.    The cardiac silhouette is normal in size. The hilar and mediastinal contours are unremarkable.    Bones are intact.                               The Emergency Provider reviewed the vital signs and test results, which are outlined above.     ED Discussion     11:10 PM: Discussed pt's case with Dr. Mueller (Pulmonary Disease) who recommends the pt undergo thoracentesis after imaging tomorrow.    11:14 PM: Discussed case with Elke Smallwood NP (Hospital Medicine). Dr. Rivero agrees with current care and management of pt and accepts admission.   Admitting Service: Hospital Medicine  Admitting Physician: Dr. Rivero  Admit to: Obs med tele    11:14 PM: Re-evaluated pt. I have discussed test results, shared treatment plan, and the need for admission with patient and family at bedside. Pt and family express understanding at this time and agree with all information. All questions answered. Pt and family have no further questions or concerns at this time. Pt is ready for admit.         Medical Decision Making:   Clinical Tests:   Lab Tests: Ordered and Reviewed  Radiological Study: Ordered and Reviewed           ED Medication(s):  Medications   LIDOcaine (PF) 10 mg/ml (1%) injection 50 mg (50 mg Intradermal Given by Other 2/12/22 1041)   LIDOcaine (PF) 10 mg/ml (1%) injection 50 mg (50 mg Other Given by Other 2/12/22 1041)       Discharge Medication List as of 2/12/2022 12:49 PM      START taking these medications    Details   levoFLOXacin (LEVAQUIN) 750 MG tablet Take 1 tablet (750 mg total) by mouth once daily. for 5 days, Starting Sat 2/12/2022, Until Thu 2/17/2022, Normal              Follow-up Information     Mak Mueller MD. Schedule an appointment as soon as possible for a visit in 1 week.    Specialty: Pulmonary Disease  Why: pulmonology [facility]  follow up  Contact information:  [address]  [place] [address]  [phone]             Primary Care Provider. Schedule an appointment as soon as possible for a visit in 3 days.    Why: hospital follow up visit                           Scribe Attestation:   Scribe #1: I performed the above scribed service and the documentation accurately describes the services I performed. I attest to the accuracy of the note.     Attending:   Physician Attestation Statement for Scribe #1: I, Makenzie Peters MD, personally performed the services described in this documentation, as scribed by Charbel Chen, in my presence, and it is both accurate and complete.           Clinical Impression       ICD-10-CM ICD-9-CM   1. Pleural effusion on right  J90 511.9   2. Chest pain  R07.9 786.50   3. Pleural effusion  J90 511.9       Disposition:   Disposition: Placed in Observation  Condition: Fair         Makenzie Peters MD  02/12/22 8053

## 2022-02-12 NOTE — FIRST PROVIDER EVALUATION
Medical screening exam completed.  I have conducted a focused provider triage encounter, findings are as follows:    Brief history of present illness:  Pt was seen at [facility] today for right sided chest pain and shortness of breath, had chest xray and ct done which showed right pleural effusion with possible atelectasis or pneumonia.  Patients onset of symptoms was 2 months ago    Vitals:    02/11/22 1931   BP: 125/64   BP Location: Right arm   Patient Position: Sitting   Pulse: 106   Resp: 16   Temp: 97.6 °F (36.4 °C)   TempSrc: Oral   SpO2: 97%   Weight: 122.7 kg (270 lb 8.1 oz)   Height: 6' (1.829 m)       Pertinent physical exam:      Brief workup plan:      Preliminary workup initiated; this workup will be continued and followed by the physician or advanced practice provider that is assigned to the patient when roomed.

## 2022-02-12 NOTE — DISCHARGE INSTRUCTIONS
You have been provided an oral antibiotic similar to the antibiotic you received during this hospitalization. Please avoid heavy lifting and explosive-type exercises while on this antibiotic and for one week after taking it.     Please schedule a hospital follow up visit with your primary care provider's office within 3-5 days.

## 2022-02-12 NOTE — SUBJECTIVE & OBJECTIVE
Past Medical History:   Diagnosis Date    ADHD (attention deficit hyperactivity disorder)     primarily inattentive    Sleep apnea        History reviewed. No pertinent surgical history.    Review of patient's allergies indicates:  No Known Allergies    Family History     Problem Relation (Age of Onset)    Cancer Paternal Grandfather        Tobacco Use    Smoking status: Former Smoker    Smokeless tobacco: Former User   Substance and Sexual Activity    Alcohol use: No    Drug use: No    Sexual activity: Never         Review of Systems   Constitutional: Positive for fatigue.   Respiratory: Positive for apnea.    Cardiovascular: Positive for leg swelling.   All other systems reviewed and are negative.    Objective:     Vital Signs (Most Recent):  Temp: 97.6 °F (36.4 °C) (02/11/22 1931)  Pulse: 68 (02/12/22 0618)  Resp: 18 (02/12/22 0618)  BP: 115/61 (02/12/22 0602)  SpO2: (!) 93 % (02/12/22 0618) Vital Signs (24h Range):  Temp:  [97.6 °F (36.4 °C)] 97.6 °F (36.4 °C)  Pulse:  [] 68  Resp:  [16-25] 18  SpO2:  [88 %-99 %] 93 %  BP: (101-143)/(50-82) 115/61     Weight: 122.7 kg (270 lb 8.1 oz)  Body mass index is 36.69 kg/m².      Intake/Output Summary (Last 24 hours) at 2/12/2022 0719  Last data filed at 2/12/2022 0610  Gross per 24 hour   Intake --   Output 200 ml   Net -200 ml       Physical Exam  Vitals and nursing note reviewed.   Constitutional:       Appearance: He is well-developed and well-nourished.   HENT:      Head: Normocephalic and atraumatic.      Nose: Nose normal.      Mouth/Throat:      Mouth: Oropharynx is clear and moist.   Eyes:      Extraocular Movements: EOM normal.      Pupils: Pupils are equal, round, and reactive to light.   Cardiovascular:      Rate and Rhythm: Normal rate and regular rhythm.      Heart sounds: Normal heart sounds.   Pulmonary:      Effort: Pulmonary effort is normal.      Breath sounds: Examination of the right-lower field reveals decreased breath sounds. Decreased  breath sounds present.       Abdominal:      General: Bowel sounds are normal.      Palpations: Abdomen is soft.   Musculoskeletal:         General: No edema. Normal range of motion.      Cervical back: Normal range of motion and neck supple.   Skin:     General: Skin is warm and dry.      Capillary Refill: Capillary refill takes 2 to 3 seconds.   Neurological:      Mental Status: He is alert and oriented to person, place, and time.      Cranial Nerves: No cranial nerve deficit.   Psychiatric:         Mood and Affect: Mood and affect normal.         Vents:       Lines/Drains/Airways     Peripheral Intravenous Line                 Peripheral IV - Single Lumen 02/11/22 2159 20 G Right Antecubital <1 day                Significant Labs:    CBC/Anemia Profile:  Recent Labs   Lab 02/11/22 2158   WBC 10.24   HGB 13.6*   HCT 41.4      MCV 84   RDW 12.6        Chemistries:  Recent Labs   Lab 02/11/22 2158      K 3.9      CO2 26   BUN 17   CREATININE 1.3   CALCIUM 9.6   ALBUMIN 4.0   PROT 7.7   BILITOT 0.6   ALKPHOS 71   ALT 22   AST 27       ABGs: No results for input(s): PH, PCO2, HCO3, POCSATURATED, BE in the last 48 hours.  All pertinent labs within the past 24 hours have been reviewed.    Significant Imaging:   I have reviewed all pertinent imaging results/findings within the past 24 hours.     X-Ray Chest PA And Lateral  Narrative: EXAMINATION:  XR CHEST PA AND LATERAL    CLINICAL HISTORY:  Chest pain, unspecified    TECHNIQUE:  PA and lateral views of the chest were performed.    COMPARISON:  None    FINDINGS:  Small right pleural effusion or scarring.  No left pleural fluid.  Otherwise the lungs are clear.    The cardiac silhouette is normal in size. The hilar and mediastinal contours are unremarkable.    Bones are intact.  Impression: As above.    Electronically signed by: Kyle Marte  Date:    02/11/2022  Time:    21:03

## 2022-02-12 NOTE — DISCHARGE SUMMARY
"[facility]  Hospital Medicine  Discharge Summary      Patient Name: Maciel Miranda  MRN: [id number]  Patient Class: OP- Observation  Admission Date: 2/11/2022  Hospital Length of Stay: 0 days  Discharge Date and Time:  02/12/2022 12:40 PM  Attending Physician: No att. providers found   Discharging Provider: Tina Romero MD  Primary Care Provider: Primary Doctor No      HPI:   Maciel Miranda is a 22 y.o. male with a PMHx only significant for ADHD, who presented to the Emergency Department with c/o right-sided pleurisy which onset gradually 2 months ago. Pt describes being SOB and having a sharp pain in his chest that has initially stayed the same in severity until 1 month ago when it started to worsen. Pt states that he feels like he is suffocating when he lies down and has a "gurgling effect" when he takes a deep breath. Pt was seen at [facility] today who did a CXR and a Chest CT which showed: Moderate consolidation involving the basal right lower lung with obscuration of the right heart margin and mild to moderate right pleural effusion. Pt denies any trauma and is a casual weight . Symptoms are constant and moderate in severity. No other mitigating or exacerbating factors reported. Associated sxs include dry cough. Denies any constitutional symptoms, no fever, weight loss, sweats or travel out of country. No wheezing, congestion, sore throat, orthopnea, PND, edema, ABD pain, N/V/D, back pain, lightheadedness, dizziness, weakness, or chills. Initial work-up in the ED was benign with stable labs and VS. Repeat CXR showed small right pleural effusion or scarring, no left pleural fluid, otherwise the lungs are clear. Case was discussed with Pulmonology per the ED, who plans on performing diagnostic thoracentesis. Hospital Medicine was contacted for admission and patient placed in Observation.       * No surgery found *      Hospital Course:   2/12   Admitted overnight for pleural " effusion. Pulm consulted for thoracentesis. Patient tolerated procedure well. After discussing with pulm, ok to dc with po abx.     Patient seen and evaluated by me. Patient was determined to be suitable for d/c. Patient deemed stable for discharge to home with po abx.    Attempted to call [facility] pharmacy to change duration of abx. Resent rx for 10 days levaquin    Called patient to notify change in duration of abx to 10 days.         Goals of Care Treatment Preferences:  Code Status: Full Code      Consults:   Consults (From admission, onward)        Status Ordering Provider     Inpatient consult to Pulmonology  Once        Provider:  Mak Mueller MD    Completed GALLITO REYES          No new Assessment & Plan notes have been filed under this hospital service since the last note was generated.  Service: Hospital Medicine    Final Active Diagnoses:    Diagnosis Date Noted POA    Suspected sleep apnea [R29.818] 02/12/2022 Yes    BMI 36.0-36.9,adult [Z68.36] 02/12/2022 Not Applicable      Problems Resolved During this Admission:    Diagnosis Date Noted Date Resolved POA    PRINCIPAL PROBLEM:  Pleural effusion on right [J90] 02/12/2022 02/12/2022 Yes       Discharged Condition: stable    Disposition: Home or Self Care    Follow Up:   Follow-up Information     Mak Mueller MD. Schedule an appointment as soon as possible for a visit in 1 week.    Specialty: Pulmonary Disease  Why: pulmonology hospital follow up  Contact information:  [address]  [place] [address] [phone]             Primary Care Provider. Schedule an appointment as soon as possible for a visit in 3 days.    Why: hospital follow up visit                     Patient Instructions:      Lifting restrictions   Order Comments: No heavy lifting or explosive exercising while on antibiotics and for one week after completing antibioticx       Significant Diagnostic Studies: Labs:   CMP   Recent Labs   Lab 02/11/22  7024  02/12/22  0828     --    K 3.9  --      --    CO2 26  --    GLU 73  --    BUN 17  --    CREATININE 1.3  --    CALCIUM 9.6  --    PROT 7.7 7.9   ALBUMIN 4.0  --    BILITOT 0.6  --    ALKPHOS 71  --    AST 27  --    ALT 22  --    ANIONGAP 10  --    ESTGFRAFRICA >60  --    EGFRNONAA >60  --    , CBC   Recent Labs   Lab 02/11/22  2158   WBC 10.24   HGB 13.6*   HCT 41.4       and All labs within the past 24 hours have been reviewed  Microbiology: body fluid  Radiology: X-Ray: CXR: X-Ray Chest 1 View (CXR):   Results for orders placed or performed during the hospital encounter of 02/11/22   X-Ray Chest 1 View    Narrative    EXAMINATION:  XR CHEST 1 VIEW    CLINICAL HISTORY:  post thora;    COMPARISON:  A study performed 4 hours earlier    FINDINGS:  Interval right thoracentesis.  No appreciable change in the size of the right pleural effusion noted.  Negative for pneumothorax.  Negative for new pulmonary opacities.  The hilar and mediastinal contours and osseous structures are unchanged.      Impression    1.  Overall, no significant interval change has occurred.  No appreciable change in size of the right pleural effusion status post right thoracentesis, without pneumothorax seen.      Electronically signed by: Ye Colvin MD  Date:    02/12/2022  Time:    10:49    and X-Ray Chest PA and Lateral (CXR):   Results for orders placed or performed during the hospital encounter of 02/11/22   X-Ray Chest PA And Lateral    Narrative    EXAMINATION:  XR CHEST PA AND LATERAL    CLINICAL HISTORY  Pleural effusion,    COMPARISON:  Comparison 02/11/2022    FINDINGS:  The heart size is normal.  The lung fields are clear.  No acute cardiopulmonary infiltrative.  Moderate size right pleural effusion.  Effusion is increased in size.      Impression    Increasing moderate size right pleural effusion.      Electronically signed by: Samantha Allison MD  Date:    02/12/2022  Time:    08:16     Ultrasound: chest  mediastinum    Pending Diagnostic Studies:     Procedure Component Value Units Date/Time    PHIL Screen w/Reflex [[id number]] Collected: 02/12/22 0828    Order Status: Sent Lab Status: In process Updated: 02/12/22 0829    Specimen: Blood     Albumin, Peritoneal, Pleural Fluid or KAROLINA Drainage, In-House Pleural Fluid, Right [[id number]] Collected: 02/12/22 1029    Order Status: Sent Lab Status: In process Updated: 02/12/22 1030    Specimen: Body Fluid     Amylase, Peritoneal, Pleural Fluid or KAR[place] Drainage, In-House Pleural Fluid, Right [[id number]] Collected: 02/12/22 1029    Order Status: Sent Lab Status: In process Updated: 02/12/22 1030    Specimen: Body Fluid     Cholesterol, Body Fluid (Reference Lab) Pleural Fluid, Right [[id number]] Collected: 02/12/22 1029    Order Status: Sent Lab Status: In process Updated: 02/12/22 1030    Specimen: Body Fluid     Cytology, Pulmonary [[id number]] Collected: 02/12/22 1029    Order Status: Sent Lab Status: In process Updated: 02/12/22 1030    Glucose, Peritoneal, Pleural Fluid or KAR[place]INA Drainage, In-House Pleural Fluid, Right [[id number]] Collected: 02/12/22 1029    Order Status: Sent Lab Status: In process Updated: 02/12/22 1030    Specimen: Body Fluid     LDH, Peritoneal, Pleural Fluid or KAR[place]INA Drainage, In-House Pleural Fluid, Right [[id number]] Collected: 02/12/22 1029    Order Status: Sent Lab Status: In process Updated: 02/12/22 1030    Specimen: Body Fluid     Protein, Peritoneal, Pleural Fluid or KAR[place]INA Drainage, In-House Pleural Fluid, Right [[id number]] Collected: 02/12/22 1029    Order Status: Sent Lab Status: In process Updated: 02/12/22 1030    Specimen: Body Fluid     Quantiferon Gold TB [[id number]] Collected: 02/12/22 0828    Order Status: Sent Lab Status: In process Updated: 02/12/22 0829    Specimen: Blood     Rheumatoid factor [[id number]] Collected: 02/12/22 0828    Order Status: Sent Lab Status: In process Updated: 02/12/22 0829    Specimen: Blood     WBC & Diff,Body Fluid  Pleural Fluid, Right [[id number]] Collected: 02/12/22 1029    Order Status: Sent Lab Status: In process Updated: 02/12/22 1030    Specimen: Body Fluid          Medications:  Reconciled Home Medications:      Medication List      START taking these medications    levoFLOXacin 500 MG tablet  Commonly known as: LEVAQUIN  Take 1 tablet (500 mg total) by mouth once daily. for 10 days        CONTINUE taking these medications    tretinoin 0.025 % gel  Commonly known as: RETIN-A  Apply topically nightly.     VYVANSE 30 MG capsule  Generic drug: lisdexamfetamine  Take 30 mg by mouth every morning.            Indwelling Lines/Drains at time of discharge:   Lines/Drains/Airways     None                 Time spent on the discharge of patient: 20 minutes         Tina Romero MD  Department of Hospital Medicine  [facility]

## 2022-02-12 NOTE — HPI
Maciel Miranda is 22 y.o.  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

## 2022-02-12 NOTE — H&P
"[facility] - Emergency Dept.  [facility] Medicine  History & Physical    Patient Name: Maciel Miranda  MRN: [id number]  Patient Class: OP- Observation  Admission Date: 2/11/2022  Attending Physician: Tina Romero MD   Primary Care Provider: Primary Doctor No         Patient information was obtained from patient and ER records.     Subjective:     Principal Problem:Pleural effusion on right    Chief Complaint:   Chief Complaint   Patient presents with    Pleurisy     Pt CO pain to R side of ribs on deep insp or exertion. Heavy weight . Had CT chest today with abnormalities. Feels SOB        HPI: Maciel Miranda is a 22 y.o. male with a PMHx only significant for ADHD, who presented to the Emergency Department with c/o right-sided pleurisy which onset gradually 2 months ago. Pt describes being SOB and having a sharp pain in his chest that has initially stayed the same in severity until 1 month ago when it started to worsen. Pt states that he feels like he is suffocating when he lies down and has a "gurgling effect" when he takes a deep breath. Pt was seen at [facility] today who did a CXR and a Chest CT which showed: Moderate consolidation involving the basal right lower lung with obscuration of the right heart margin and mild to moderate right pleural effusion. Pt denies any trauma and is a casual weight . Symptoms are constant and moderate in severity. No other mitigating or exacerbating factors reported. Associated sxs include dry cough. Denies any constitutional symptoms, no fever, weight loss, sweats or travel out of country. No wheezing, congestion, sore throat, orthopnea, PND, edema, ABD pain, N/V/D, back pain, lightheadedness, dizziness, weakness, or chills. Initial work-up in the ED was benign with stable labs and VS. Repeat CXR showed small right pleural effusion or scarring, no left pleural fluid, otherwise the lungs are clear. Case was discussed with Pulmonology per the ED, who " plans on performing diagnostic thoracentesis. [facility] Medicine was contacted for admission and patient placed in Observation.       Past Medical History:   Diagnosis Date    ADHD (attention deficit hyperactivity disorder)     primarily inattentive       History reviewed. No pertinent surgical history.    Review of patient's allergies indicates:  No Known Allergies    No current facility-administered medications on file prior to encounter.     Current Outpatient Medications on File Prior to Encounter   Medication Sig    tretinoin (RETIN-A) 0.025 % gel Apply topically nightly.    VYVANSE 30 mg capsule Take 30 mg by mouth every morning.     Family History     Problem Relation (Age of Onset)    Cancer Paternal Grandfather        Tobacco Use    Smoking status: Former Smoker    Smokeless tobacco: Former User   Substance and Sexual Activity    Alcohol use: No    Drug use: No    Sexual activity: Never     Review of Systems   Constitutional: Negative for chills, diaphoresis, fatigue and fever.   HENT: Negative for congestion, postnasal drip, rhinorrhea, sinus pressure and sore throat.    Respiratory: Positive for cough and shortness of breath. Negative for wheezing.    Cardiovascular: Positive for chest pain. Negative for palpitations and leg swelling.   Gastrointestinal: Negative for abdominal pain, diarrhea, nausea and vomiting.   Musculoskeletal: Negative for arthralgias, back pain and myalgias.   Skin: Negative for pallor and rash.   Neurological: Negative for dizziness, weakness, light-headedness and headaches.   All other systems reviewed and are negative.    Objective:     Vital Signs (Most Recent):  Temp: 97.6 °F (36.4 °C) (02/11/22 1931)  Pulse: 75 (02/12/22 0100)  Resp: (!) 25 (02/12/22 0100)  BP: 118/64 (02/12/22 0100)  SpO2: (!) 94 % (02/12/22 0100) Vital Signs (24h Range):  Temp:  [97.6 °F (36.4 °C)] 97.6 °F (36.4 °C)  Pulse:  [] 75  Resp:  [16-25] 25  SpO2:  [94 %-99 %] 94 %  BP:  (116-139)/(55-82) 118/64     Weight: 122.7 kg (270 lb 8.1 oz)  Body mass index is 36.69 kg/m².    Physical Exam  Vitals and nursing note reviewed.   Constitutional:       General: He is awake. He is not in acute distress.     Appearance: Normal appearance. He is well-developed. He is not diaphoretic.   HENT:      Head: Normocephalic and atraumatic.   Eyes:      Conjunctiva/sclera: Conjunctivae normal.   Neck:      Vascular: No JVD.   Cardiovascular:      Rate and Rhythm: Normal rate and regular rhythm.  No extrasystoles are present.     Heart sounds: S1 normal and S2 normal. No murmur heard.  No friction rub. No gallop.    Pulmonary:      Effort: Pulmonary effort is normal. Tachypnea present. No accessory muscle usage or respiratory distress.      Breath sounds: Normal air entry. Examination of the right-lower field reveals decreased breath sounds. Decreased breath sounds present. No wheezing, rhonchi or rales.   Abdominal:      General: Bowel sounds are normal. There is no distension.      Palpations: Abdomen is soft.      Tenderness: There is no abdominal tenderness.   Musculoskeletal:         General: No tenderness or deformity. Normal range of motion.      Cervical back: Normal range of motion and neck supple.      Right lower leg: No edema.      Left lower leg: No edema.   Skin:     General: Skin is warm and dry.      Capillary Refill: Capillary refill takes less than 2 seconds.      Findings: No erythema or rash.   Neurological:      General: No focal deficit present.      Mental Status: He is alert and oriented to person, place, and time.   Psychiatric:         Mood and Affect: Mood and affect normal.         Behavior: Behavior normal. Behavior is cooperative.             Significant Labs:  Results for orders placed or performed during the hospital encounter of 02/11/22   CBC auto differential   Result Value Ref Range    WBC 10.24 3.90 - 12.70 K/uL    RBC 4.92 4.60 - 6.20 M/uL    Hemoglobin 13.6 (L) 14.0 -  18.0 g/dL    Hematocrit 41.4 40.0 - 54.0 %    MCV 84 82 - 98 fL    MCH 27.6 27.0 - 31.0 pg    MCHC 32.9 32.0 - 36.0 g/dL    RDW 12.6 11.5 - 14.5 %    Platelets 427 150 - 450 K/uL    MPV 8.2 (L) 9.2 - 12.9 fL    Immature Granulocytes 0.3 0.0 - 0.5 %    Gran # (ANC) 5.8 1.8 - 7.7 K/uL    Immature Grans (Abs) 0.03 0.00 - 0.04 K/uL    Lymph # 2.7 1.0 - 4.8 K/uL    Mono # 0.9 0.3 - 1.0 K/uL    Eos # 0.8 (H) 0.0 - 0.5 K/uL    Baso # 0.07 0.00 - 0.20 K/uL    nRBC 0 0 /100 WBC    Gran % 56.4 38.0 - 73.0 %    Lymph % 26.0 18.0 - 48.0 %    Mono % 8.5 4.0 - 15.0 %    Eosinophil % 8.1 (H) 0.0 - 8.0 %    Basophil % 0.7 0.0 - 1.9 %    Differential Method Automated    Comprehensive metabolic panel   Result Value Ref Range    Sodium 141 136 - 145 mmol/L    Potassium 3.9 3.5 - 5.1 mmol/L    Chloride 105 95 - 110 mmol/L    CO2 26 23 - 29 mmol/L    Glucose 73 70 - 110 mg/dL    BUN 17 6 - 20 mg/dL    Creatinine 1.3 0.5 - 1.4 mg/dL    Calcium 9.6 8.7 - 10.5 mg/dL    Total Protein 7.7 6.0 - 8.4 g/dL    Albumin 4.0 3.5 - 5.2 g/dL    Total Bilirubin 0.6 0.1 - 1.0 mg/dL    Alkaline Phosphatase 71 55 - 135 U/L    AST 27 10 - 40 U/L    ALT 22 10 - 44 U/L    Anion Gap 10 8 - 16 mmol/L    eGFR if African American >60 >60 mL/min/1.73 m^2    eGFR if non African American >60 >60 mL/min/1.73 m^2   COVID-19 Rapid Screening   Result Value Ref Range    SARS-CoV-2 RNA, Amplification, Qual Negative Negative      All pertinent labs within the past 24 hours have been reviewed.    Significant Imaging:  Imaging Results          X-Ray Chest PA And Lateral (Final result)  Result time 02/11/22 21:03:28    Final result by Kyle Marte MD (02/11/22 21:03:28)                 Impression:      As above.      Electronically signed by: Kyle Marte  Date:    02/11/2022  Time:    21:03             Narrative:    EXAMINATION:  XR CHEST PA AND LATERAL    CLINICAL HISTORY:  Chest pain, unspecified    TECHNIQUE:  PA and lateral views of the chest were  performed.    COMPARISON:  None    FINDINGS:  Small right pleural effusion or scarring.  No left pleural fluid.  Otherwise the lungs are clear.    The cardiac silhouette is normal in size. The hilar and mediastinal contours are unremarkable.    Bones are intact.                               I have reviewed all pertinent imaging results/findings within the past 24 hours.               Assessment/Plan:     * Pleural effusion on right  - Pulmonology consult. Plans for diagnostic thoracentesis today.  - O2 sat stable on RA. Oxygen supplementation as needed to keep sat >92%.      VTE Risk Mitigation (From admission, onward)         Ordered     IP VTE HIGH RISK PATIENT  Once         02/12/22 0136     Place sequential compression device  Until discontinued         02/12/22 0136     Reason for No Pharmacological VTE Prophylaxis  Once        Question:  Reasons:  Answer:  Risk of Bleeding    02/12/22 0136                   Elke Smallwood NP  Department of Hospital Medicine   [facility] - Emergency Dept.

## 2022-02-13 LAB
ALBUMIN FLD-MCNC: 3.1 G/DL
AMYLASE, BODY FLUID: 32 U/L
APPEARANCE FLD: NORMAL
BODY FLD TYPE: NORMAL
BODY FLUID SOURCE AMYLASE: NORMAL
BODY FLUID SOURCE, LDH: NORMAL
COLOR FLD: NORMAL
EOSINOPHIL NFR FLD MANUAL: 25 %
GLUCOSE FLD-MCNC: 124 MG/DL
GRAM STN SPEC: NORMAL
LDH FLD L TO P-CCNC: 562 U/L
LYMPHOCYTES NFR FLD MANUAL: 18 %
MESOTHL CELL NFR FLD MANUAL: 14 %
MONOS+MACROS NFR FLD MANUAL: 39 %
NEUTROPHILS NFR FLD MANUAL: 4 %
PROT FLD-MCNC: 5.3 G/DL
RHEUMATOID FACT SERPL-ACNC: <13 IU/ML (ref 0–15)
SPECIMEN SOURCE: NORMAL
WBC # FLD: 5226 /CU MM

## 2022-02-14 ENCOUNTER — TELEPHONE (OUTPATIENT)
Dept: PULMONOLOGY | Facility: CLINIC | Age: 22
End: 2022-02-14
Payer: MEDICAID

## 2022-02-14 LAB
ANA SER QL IF: NORMAL
PATH INTERP FLD-IMP: NORMAL

## 2022-02-14 NOTE — TELEPHONE ENCOUNTER
----- Message from Anahi Del Rio RN sent at 2/12/2022 12:55 PM CST -----  Regarding: hospital f/u  Patient needs a hospital f/up appointment with physician or mid level provider in one week.  Please call patient with date and time of arranged appointment. Thank you.

## 2022-02-15 ENCOUNTER — TELEPHONE (OUTPATIENT)
Dept: PULMONOLOGY | Facility: CLINIC | Age: 22
End: 2022-02-15
Payer: MEDICAID

## 2022-02-15 DIAGNOSIS — J90 PLEURAL EFFUSION: Primary | ICD-10-CM

## 2022-02-16 LAB — CHOLEST FLD-MCNC: 99 MG/DL

## 2022-02-17 ENCOUNTER — HOSPITAL ENCOUNTER (OUTPATIENT)
Dept: RADIOLOGY | Facility: HOSPITAL | Age: 22
Discharge: HOME | End: 2022-02-17
Attending: INTERNAL MEDICINE
Payer: MEDICAID

## 2022-02-17 ENCOUNTER — PATIENT MESSAGE (OUTPATIENT)
Dept: PULMONOLOGY | Facility: CLINIC | Age: 22
End: 2022-02-17
Payer: MEDICAID

## 2022-02-17 DIAGNOSIS — J90 PLEURAL EFFUSION: ICD-10-CM

## 2022-02-17 PROCEDURE — 71046 X-RAY EXAM CHEST 2 VIEWS: CPT | Mod: 26,,, | Performed by: RADIOLOGY

## 2022-02-17 PROCEDURE — 71046 XR CHEST PA AND LATERAL: ICD-10-PCS | Mod: 26,,, | Performed by: RADIOLOGY

## 2022-02-17 PROCEDURE — 71046 X-RAY EXAM CHEST 2 VIEWS: CPT | Mod: TC

## 2022-02-18 LAB
FINAL PATHOLOGIC DIAGNOSIS: NORMAL
Lab: NORMAL

## 2022-02-25 ENCOUNTER — HOSPITAL ENCOUNTER (OUTPATIENT)
Dept: RADIOLOGY | Facility: HOSPITAL | Age: 22
Discharge: HOME | End: 2022-02-25
Attending: INTERNAL MEDICINE
Payer: MEDICAID

## 2022-02-25 ENCOUNTER — TELEPHONE (OUTPATIENT)
Dept: PULMONOLOGY | Facility: CLINIC | Age: 22
End: 2022-02-25
Payer: MEDICAID

## 2022-02-25 DIAGNOSIS — J90 PLEURAL EFFUSION: Primary | ICD-10-CM

## 2022-02-25 DIAGNOSIS — J90 PLEURAL EFFUSION: ICD-10-CM

## 2022-02-25 PROCEDURE — 71046 XR CHEST PA AND LATERAL: ICD-10-PCS | Mod: 26,,, | Performed by: RADIOLOGY

## 2022-02-25 PROCEDURE — 71046 X-RAY EXAM CHEST 2 VIEWS: CPT | Mod: TC

## 2022-02-25 PROCEDURE — 71046 X-RAY EXAM CHEST 2 VIEWS: CPT | Mod: 26,,, | Performed by: RADIOLOGY

## 2022-03-07 ENCOUNTER — PATIENT MESSAGE (OUTPATIENT)
Dept: PULMONOLOGY | Facility: CLINIC | Age: 22
End: 2022-03-07
Payer: MEDICAID

## 2022-03-09 ENCOUNTER — TELEPHONE (OUTPATIENT)
Dept: PULMONOLOGY | Facility: CLINIC | Age: 22
End: 2022-03-09
Payer: MEDICAID

## 2022-03-09 DIAGNOSIS — J90 PLEURAL EFFUSION: Primary | ICD-10-CM

## 2022-03-09 NOTE — TELEPHONE ENCOUNTER
----- Message from Mak Mueller MD sent at 3/9/2022  7:46 AM CST -----  Contact: Pt Mobile/Home [phone]  Appt with any APPT as f/u  Needs CXR    ----- Message -----  From: Winter Corado MA  Sent: 3/8/2022  12:39 PM CST  To: Mak Mueller MD    Advised pt that you were out of clinic today, but that i'll forward message to you.   ----- Message -----  From: Kimberlee Michelle  Sent: 3/8/2022  11:57 AM CST  To: Ervin Corado Staff    Patient said that he came to see you in the ER, and he would like to speak with you about the pain that his having in his right side rib cage, patient also have pain in his shoulders and burning in his lungs.

## 2022-03-10 ENCOUNTER — TELEPHONE (OUTPATIENT)
Dept: PULMONOLOGY | Facility: CLINIC | Age: 22
End: 2022-03-10

## 2022-03-10 ENCOUNTER — HOSPITAL ENCOUNTER (OUTPATIENT)
Dept: RADIOLOGY | Facility: HOSPITAL | Age: 22
Discharge: HOME | End: 2022-03-10
Attending: INTERNAL MEDICINE
Payer: MEDICAID

## 2022-03-10 ENCOUNTER — OFFICE VISIT (OUTPATIENT)
Dept: PULMONOLOGY | Facility: CLINIC | Age: 22
End: 2022-03-10
Payer: MEDICAID

## 2022-03-10 VITALS
BODY MASS INDEX: 36.43 KG/M2 | WEIGHT: 268.94 LBS | DIASTOLIC BLOOD PRESSURE: 74 MMHG | RESPIRATION RATE: 16 BRPM | OXYGEN SATURATION: 97 % | HEART RATE: 79 BPM | SYSTOLIC BLOOD PRESSURE: 120 MMHG | HEIGHT: 72 IN

## 2022-03-10 DIAGNOSIS — J90 PLEURAL EFFUSION, NOT ELSEWHERE CLASSIFIED: Primary | ICD-10-CM

## 2022-03-10 DIAGNOSIS — F90.8 ATTENTION DEFICIT HYPERACTIVITY DISORDER (ADHD), OTHER TYPE: ICD-10-CM

## 2022-03-10 DIAGNOSIS — J90 PLEURAL EFFUSION: ICD-10-CM

## 2022-03-10 DIAGNOSIS — G47.30 SLEEP-DISORDERED BREATHING: ICD-10-CM

## 2022-03-10 PROCEDURE — 71046 X-RAY EXAM CHEST 2 VIEWS: CPT | Mod: 26,,, | Performed by: RADIOLOGY

## 2022-03-10 PROCEDURE — 1159F PR MEDICATION LIST DOCUMENTED IN MEDICAL RECORD: ICD-10-PCS | Mod: CPTII,,, | Performed by: PHYSICIAN ASSISTANT

## 2022-03-10 PROCEDURE — 71046 XR CHEST PA AND LATERAL: ICD-10-PCS | Mod: 26,,, | Performed by: RADIOLOGY

## 2022-03-10 PROCEDURE — 99999 PR PBB SHADOW E&M-EST. PATIENT-LVL III: ICD-10-PCS | Mod: PBBFAC,,, | Performed by: PHYSICIAN ASSISTANT

## 2022-03-10 PROCEDURE — 3078F DIAST BP <80 MM HG: CPT | Mod: CPTII,,, | Performed by: PHYSICIAN ASSISTANT

## 2022-03-10 PROCEDURE — 99214 OFFICE O/P EST MOD 30 MIN: CPT | Mod: S$PBB,,, | Performed by: PHYSICIAN ASSISTANT

## 2022-03-10 PROCEDURE — 3008F BODY MASS INDEX DOCD: CPT | Mod: CPTII,,, | Performed by: PHYSICIAN ASSISTANT

## 2022-03-10 PROCEDURE — 99214 PR OFFICE/OUTPT VISIT, EST, LEVL IV, 30-39 MIN: ICD-10-PCS | Mod: S$PBB,,, | Performed by: PHYSICIAN ASSISTANT

## 2022-03-10 PROCEDURE — 3074F SYST BP LT 130 MM HG: CPT | Mod: CPTII,,, | Performed by: PHYSICIAN ASSISTANT

## 2022-03-10 PROCEDURE — 1159F MED LIST DOCD IN RCRD: CPT | Mod: CPTII,,, | Performed by: PHYSICIAN ASSISTANT

## 2022-03-10 PROCEDURE — 3078F PR MOST RECENT DIASTOLIC BLOOD PRESSURE < 80 MM HG: ICD-10-PCS | Mod: CPTII,,, | Performed by: PHYSICIAN ASSISTANT

## 2022-03-10 PROCEDURE — 99213 OFFICE O/P EST LOW 20 MIN: CPT | Mod: PBBFAC,25 | Performed by: PHYSICIAN ASSISTANT

## 2022-03-10 PROCEDURE — 99999 PR PBB SHADOW E&M-EST. PATIENT-LVL III: CPT | Mod: PBBFAC,,, | Performed by: PHYSICIAN ASSISTANT

## 2022-03-10 PROCEDURE — 71046 X-RAY EXAM CHEST 2 VIEWS: CPT | Mod: TC

## 2022-03-10 PROCEDURE — 1160F RVW MEDS BY RX/DR IN RCRD: CPT | Mod: CPTII,,, | Performed by: PHYSICIAN ASSISTANT

## 2022-03-10 PROCEDURE — 3008F PR BODY MASS INDEX (BMI) DOCUMENTED: ICD-10-PCS | Mod: CPTII,,, | Performed by: PHYSICIAN ASSISTANT

## 2022-03-10 PROCEDURE — 3074F PR MOST RECENT SYSTOLIC BLOOD PRESSURE < 130 MM HG: ICD-10-PCS | Mod: CPTII,,, | Performed by: PHYSICIAN ASSISTANT

## 2022-03-10 PROCEDURE — 1160F PR REVIEW ALL MEDS BY PRESCRIBER/CLIN PHARMACIST DOCUMENTED: ICD-10-PCS | Mod: CPTII,,, | Performed by: PHYSICIAN ASSISTANT

## 2022-03-10 NOTE — PROGRESS NOTES
Subjective:       Patient ID: Maciel Miranda is a 22 y.o. male.    Chief Complaint: Apnea      23yo male here for follow up of plueral effusion  ER visit one month ago, thoracentesis with Dr. Mueller  PLEURAL EFFUSION, RIGHT, CYTOLOGY:   Negative for malignant cells.   Mesothelial cells and inflammation.   Took levaquin, improvement on Chest X Ray today although still with persistent small effusion  Patient reports improvement of pleurisy after completing antibiotics, but says pleurisy has returned accompanied by SOB, mentions feeling panicked sometimes about it and feels lightheaded    Also requesting sleep evaluation   In bed about 1am, no problems falling asleep, wakes frequently, gets up about 9am, never feels rested  Complains of daytime fatigue, snores, witnessed apneas, had a friend monitor his pulse ox while sleeping and says oxygen dropped  No sleep aids  No alcohol or caffeine  Takes vyvance for ADHD  No restless legs  No narcolepsy       BP Readings from Last 3 Encounters:   03/10/22 120/74   02/12/22 (!) 98/52   12/14/16 108/62 (16 %, Z = -0.99 /  23 %, Z = -0.74)*     *BP percentiles are based on the 2017 AAP Clinical Practice Guideline for boys     Snoring / Sleep:     [place] Questionnaire (validated EUGENIO screening questionnaire)    yes -- Snoring/apnea    yes -- Fatigue    Body mass index is 36.48 kg/m².  (>25 is overweight, >30 is obese)    Blood Pressure = normal blood pressure  (PreHTN 120-139/80-89, Stg1 140-159/90-99, Stg2 >160/>100)  [place] = 2 of three EUGENIO categories are positive (high risk is 2-3 positive categories)     [place] Sleepiness Scale TOTAL = 8    (validated sleepiness questionnaire with a higher score indicating greater sleepiness; range 0-24)  [place] SLEEPINESS SCALE 3/10/2022   Sitting and reading 0   Watching TV 1   Sitting, inactive in a public place (e.g. a theatre or a meeting) 0   As a passenger in a car for an hour without a break 2   Lying down to rest in the  afternoon when circumstances permit 3   Sitting and talking to someone 0   Sitting quietly after a lunch without alcohol 2   In a car, while stopped for a few minutes in traffic 0   Total score 8       STOP-Bang Questionnaire (validated EUGENIO screening questionnaire)  Negative unless checked off.  [x] Snoring    [x]  Tired/Fatigued/Sleepy  [x] Obstruction (apneas/choking)  [] Pressure (HTN)  [x] BMI >35  [] Age >50  [x] Neck >40 cm  [x] Gender male   STOP-Bang = 6 (low risk 0-2,high risk 3-8)       Immunization History   Administered Date(s) Administered    HPV 9-Valent 08/26/2015    HPV Quadrivalent 07/09/2012, 06/17/2014    Meningococcal Conjugate (MCV4P) 08/26/2015    PPD Test 05/28/2013, 06/17/2014, 08/26/2015, 12/14/2016, 01/17/2017      Tobacco Use: Medium Risk    Smoking Tobacco Use: Former Smoker    Smokeless Tobacco Use: Former User      Past Medical History:   Diagnosis Date    ADHD (attention deficit hyperactivity disorder)     primarily inattentive    Sleep apnea       Current Outpatient Medications on File Prior to Visit   Medication Sig Dispense Refill    tretinoin (RETIN-A) 0.025 % gel Apply topically nightly. 45 g 3    VYVANSE 30 mg capsule Take 30 mg by mouth every morning.       No current facility-administered medications on file prior to visit.        Review of Systems   Constitutional: Negative for fever, weight loss, appetite change, fatigue and weakness.   HENT: Negative for postnasal drip, rhinorrhea, sinus pressure, trouble swallowing and congestion.    Respiratory: Positive for apnea, snoring, pleurisy and somnolence. Negative for cough, sputum production, choking, chest tightness, shortness of breath, wheezing and dyspnea on extertion.    Cardiovascular: Negative for chest pain and leg swelling.   Musculoskeletal: Negative for arthralgias, gait problem and joint swelling.   Gastrointestinal: Negative for nausea, vomiting and abdominal pain.   Neurological: Positive for  light-headedness. Negative for dizziness, weakness and headaches.   All other systems reviewed and are negative.      Objective:       Vitals:    03/10/22 0840   BP: 120/74   Pulse: 79   Resp: 16   SpO2: 97%   Weight: 122 kg (268 lb 15.4 oz)   Height: 6' (1.829 m)       Physical Exam   Constitutional: He is oriented to person, place, and time. He appears well-developed and well-nourished. No distress.   HENT:   Head: Normocephalic.   Mouth/Throat: Oropharynx is clear and moist. Mallampati Score: II.   Neck:   19inches   Cardiovascular: Normal rate and regular rhythm.   Pulmonary/Chest: Effort normal. No respiratory distress. He has no wheezes. He has no rhonchi. He has no rales.   Decreased sounds in right lower field   Musculoskeletal:         General: No edema.      Cervical back: Normal range of motion and neck supple.   Lymphadenopathy: No supraclavicular adenopathy is present.     He has no cervical adenopathy.   Neurological: He is alert and oriented to person, place, and time. Gait normal.   Skin: Skin is warm and dry.   Psychiatric: He has a normal mood and affect.   Vitals reviewed.    Personal Diagnostic Review    X-Ray Chest PA And Lateral  Narrative: EXAMINATION:  XR CHEST PA AND LATERAL    CLINICAL HISTORY:  Pleural effusion, not elsewhere classified    TECHNIQUE:  PA and lateral views of the chest were performed.    COMPARISON:  02/25/2022    FINDINGS:  The cardiac and mediastinal silhouettes appear within normal limits.   There is a persistent small right-sided pleural effusion which does not appear significantly changed from prior.  Left lung remains clear.  No acute osseous findings demonstrated.  Impression: Unchanged appearance of the chest as above    Electronically signed by: Cam Pacheco MD  Date:    03/10/2022  Time:    09:14          Assessment/Plan:       Problem List Items Addressed This Visit        Psychiatric    ADHD (attention deficit hyperactivity disorder)     vyvanse                Pulmonary    Pleural effusion, not elsewhere classified - Primary     Persistent small right effusion  Chest CT next available           Relevant Orders    CT Chest With Contrast       Endocrine    BMI 36.0-36.9,adult     Weight loss and exercise to improve overall health.                Other    Sleep-disordered breathing     STOPbang 6, [place] 2, Ep 8  Home sleep study  EUGENIO and implications on health discussed               Relevant Orders    Home Sleep Studies        Home sleep study 2 nights. Chest CT next available and follow up with Dr. Mueller.    Discussed diagnosis, its evaluation, treatment and usual course. All questions answered.    Patient verbalized understanding of plan and left in no acute distress    Thank you for the courtesy of participating in the care of this patient    Rupali Guo PA-C

## 2022-03-21 PROCEDURE — 95800 PR SLEEP STUDY, UNATTENDED, RECORD HEART RATE/O2 SAT/RESP ANAL/SLEEP TIME: ICD-10-PCS | Mod: 26,S$PBB,, | Performed by: INTERNAL MEDICINE

## 2022-03-21 PROCEDURE — 95800 SLP STDY UNATTENDED: CPT | Mod: 26,S$PBB,, | Performed by: INTERNAL MEDICINE

## 2022-03-25 ENCOUNTER — PROCEDURE VISIT (OUTPATIENT)
Dept: SLEEP MEDICINE | Facility: CLINIC | Age: 22
End: 2022-03-25
Payer: MEDICAID

## 2022-03-25 DIAGNOSIS — G47.30 SLEEP-DISORDERED BREATHING: ICD-10-CM

## 2022-03-25 PROCEDURE — 95800 SLP STDY UNATTENDED: CPT | Mod: PBBFAC | Performed by: INTERNAL MEDICINE

## 2022-03-25 NOTE — PROCEDURES
Home Sleep Studies    Date/Time: 3/25/2022 8:00 AM  Performed by: Mak Mueller MD  Authorized by: Rupali Guo PA-C       PHYSICIAN INTERPRETATION AND COMMENTS: Findings are consistent with MILD obstructive sleep apnea(EUGENIO). AHI was  14.0/hr Night #2:  CLINICAL HISTORY: 22 year old male presented with: 17.5 inch neck, BMI of 29.8, an [place] sleepiness score of 13, history  of depression and symptoms of nocturnal snoring, waking up choking and witnessed apneas. Based on the clinical  history, the patient has a high pre-test probability of having Severe EUGENIO.

## 2022-03-25 NOTE — Clinical Note
PHYSICIAN INTERPRETATION AND COMMENTS: Findings are consistent with MILD obstructive sleep apnea(EUGENIO). AHI was 14.0/hr Night #2: CLINICAL HISTORY: 22 year old male presented with: 17.5 inch neck, BMI of 29.8, an [place] sleepiness score of 13, history of depression and symptoms of nocturnal snoring, waking up choking and witnessed apneas. Based on the clinical history, the patient has a high pre-test probability of having Severe EUGENIO.

## 2022-03-28 ENCOUNTER — PATIENT MESSAGE (OUTPATIENT)
Dept: PULMONOLOGY | Facility: CLINIC | Age: 22
End: 2022-03-28
Payer: MEDICAID

## 2022-03-28 DIAGNOSIS — G47.33 OSA (OBSTRUCTIVE SLEEP APNEA): Primary | ICD-10-CM

## 2022-03-28 NOTE — TELEPHONE ENCOUNTER
Home Sleep Studies     Date/Time: 3/25/2022 8:00 AM  Performed by: Mak Mueller MD  Authorized by: Rupali Guo PA-C        PHYSICIAN INTERPRETATION AND COMMENTS: Findings are consistent with MILD obstructive sleep apnea(EUGENIO). AHI was  14.0/hr Night #2:  CLINICAL HISTORY: 22 year old male presented with: 17.5 inch neck, BMI of 29.8, an [place] sleepiness score of 13, history  of depression and symptoms of nocturnal snoring, waking up choking and witnessed apneas. Based on the clinical  history, the patient has a high pre-test probability of having Severe EUGENIO.    AutoPAP 4-20 and nasal cushion mask ordered  Orders Placed This Encounter   Procedures    CPAP FOR HOME USE     Order Specific Question:   Length of need (1-99 months):     Answer:   99     Order Specific Question:   Fulfillment Priority:     Answer:   Level 4:  all others     Order Specific Question:   Type ():     Answer:   Auto CPAP     Order Specific Question:   Auto CPAP pressure setting range (cmH20):     Answer:   4-20     Order Specific Question:   Humidification ():     Answer:   Heated     Order Specific Question:   Choose ONE mask type and its corresponding cushions and/or pillows:     Answer:    Nasal Cushion Mask, 1 per 90 days:  Nasal Cushions, (6 per 90 days)     Order Specific Question:   Choose EITHER Heated or Non-Heated Tubjing     Answer:    Non-Heated Tubing, 1 per 90 days     Order Specific Question:   All other supplies as needed as listed below:     Answer:    Chin Strap, 1 per 180 days     Order Specific Question:   All other supplies as needed as listed below:     Answer:    Non-Disposable Filter, 1 per 180 days     Order Specific Question:   All other supplies as needed as listed below:     Answer:    Disposable Filter, 6 per 90 days     Order Specific Question:   All other supplies as needed as listed below:     Answer:    Headgear, 1 per 180 days     Order Specific  Question:   All other supplies as needed as listed below:     Answer:    Humidifier Chamber, 1 per 180 days    CPAP/BIPAP SUPPLIES     Order Specific Question:   Length of need (1-99 months):     Answer:   99     Order Specific Question:   Choose ONE mask type and its corresponding cushions and/or pillows:     Answer:    Nasal Cushion Mask, 1 per 90 days:  Nasal Cushions, (6 per 90 days)     Order Specific Question:   Choose EITHER Heated or Non-Heated Tubjing     Answer:    Non-Heated Tubing, 1 per 90 days     Order Specific Question:   All other supplies as needed as listed below:     Answer:    Headgear, 1 per 180 days     Order Specific Question:   All other supplies as needed as listed below:     Answer:    Chin Strap, 1 per 180 days     Order Specific Question:   All other supplies as needed as listed below:     Answer:    Non-Disposable Filter, 1 per 180 days     Order Specific Question:   All other supplies as needed as listed below:     Answer:    Disposable Filter, 6 per 90 days     Order Specific Question:   All other supplies as needed as listed below:     Answer:    Humidifier Chamber, 1 per 180 days

## 2022-03-31 ENCOUNTER — HOSPITAL ENCOUNTER (EMERGENCY)
Facility: HOSPITAL | Age: 22
Discharge: HOME | End: 2022-03-31
Attending: EMERGENCY MEDICINE
Payer: MEDICAID

## 2022-03-31 VITALS
OXYGEN SATURATION: 99 % | BODY MASS INDEX: 37.03 KG/M2 | RESPIRATION RATE: 16 BRPM | DIASTOLIC BLOOD PRESSURE: 62 MMHG | TEMPERATURE: 98 F | HEIGHT: 72 IN | SYSTOLIC BLOOD PRESSURE: 125 MMHG | HEART RATE: 74 BPM | WEIGHT: 273.38 LBS

## 2022-03-31 DIAGNOSIS — R09.1 PLEURISY: Primary | ICD-10-CM

## 2022-03-31 DIAGNOSIS — R07.9 CHEST PAIN: ICD-10-CM

## 2022-03-31 LAB
ALBUMIN SERPL BCP-MCNC: 4.2 G/DL (ref 3.5–5.2)
ALP SERPL-CCNC: 76 U/L (ref 55–135)
ALT SERPL W/O P-5'-P-CCNC: 48 U/L (ref 10–44)
ANION GAP SERPL CALC-SCNC: 11 MMOL/L (ref 8–16)
AST SERPL-CCNC: 29 U/L (ref 10–40)
BASOPHILS # BLD AUTO: 0.07 K/UL (ref 0–0.2)
BASOPHILS NFR BLD: 0.7 % (ref 0–1.9)
BILIRUB SERPL-MCNC: 0.5 MG/DL (ref 0.1–1)
BNP SERPL-MCNC: <10 PG/ML (ref 0–99)
BUN SERPL-MCNC: 17 MG/DL (ref 6–20)
CALCIUM SERPL-MCNC: 9.7 MG/DL (ref 8.7–10.5)
CHLORIDE SERPL-SCNC: 106 MMOL/L (ref 95–110)
CO2 SERPL-SCNC: 24 MMOL/L (ref 23–29)
CREAT SERPL-MCNC: 1.3 MG/DL (ref 0.5–1.4)
DIFFERENTIAL METHOD: ABNORMAL
EOSINOPHIL # BLD AUTO: 0.6 K/UL (ref 0–0.5)
EOSINOPHIL NFR BLD: 6 % (ref 0–8)
ERYTHROCYTE [DISTWIDTH] IN BLOOD BY AUTOMATED COUNT: 13.1 % (ref 11.5–14.5)
EST. GFR  (AFRICAN AMERICAN): >60 ML/MIN/1.73 M^2
EST. GFR  (NON AFRICAN AMERICAN): >60 ML/MIN/1.73 M^2
GLUCOSE SERPL-MCNC: 80 MG/DL (ref 70–110)
HCT VFR BLD AUTO: 45.2 % (ref 40–54)
HGB BLD-MCNC: 15 G/DL (ref 14–18)
IMM GRANULOCYTES # BLD AUTO: 0.03 K/UL (ref 0–0.04)
IMM GRANULOCYTES NFR BLD AUTO: 0.3 % (ref 0–0.5)
LYMPHOCYTES # BLD AUTO: 3 K/UL (ref 1–4.8)
LYMPHOCYTES NFR BLD: 30.3 % (ref 18–48)
MCH RBC QN AUTO: 27.3 PG (ref 27–31)
MCHC RBC AUTO-ENTMCNC: 33.2 G/DL (ref 32–36)
MCV RBC AUTO: 82 FL (ref 82–98)
MONOCYTES # BLD AUTO: 0.8 K/UL (ref 0.3–1)
MONOCYTES NFR BLD: 8.2 % (ref 4–15)
NEUTROPHILS # BLD AUTO: 5.4 K/UL (ref 1.8–7.7)
NEUTROPHILS NFR BLD: 54.5 % (ref 38–73)
NRBC BLD-RTO: 0 /100 WBC
PLATELET # BLD AUTO: 407 K/UL (ref 150–450)
PMV BLD AUTO: 8.4 FL (ref 9.2–12.9)
POTASSIUM SERPL-SCNC: 4.1 MMOL/L (ref 3.5–5.1)
PROT SERPL-MCNC: 8.3 G/DL (ref 6–8.4)
RBC # BLD AUTO: 5.5 M/UL (ref 4.6–6.2)
SODIUM SERPL-SCNC: 141 MMOL/L (ref 136–145)
TROPONIN I SERPL DL<=0.01 NG/ML-MCNC: <0.006 NG/ML (ref 0–0.03)
WBC # BLD AUTO: 9.86 K/UL (ref 3.9–12.7)

## 2022-03-31 PROCEDURE — 93005 ELECTROCARDIOGRAM TRACING: CPT

## 2022-03-31 PROCEDURE — 85025 COMPLETE CBC W/AUTO DIFF WBC: CPT | Performed by: NURSE PRACTITIONER

## 2022-03-31 PROCEDURE — 84484 ASSAY OF TROPONIN QUANT: CPT | Performed by: NURSE PRACTITIONER

## 2022-03-31 PROCEDURE — 96372 THER/PROPH/DIAG INJ SC/IM: CPT | Performed by: EMERGENCY MEDICINE

## 2022-03-31 PROCEDURE — 99285 EMERGENCY DEPT VISIT HI MDM: CPT | Mod: 25

## 2022-03-31 PROCEDURE — 25500020 PHARM REV CODE 255: Performed by: EMERGENCY MEDICINE

## 2022-03-31 PROCEDURE — 83880 ASSAY OF NATRIURETIC PEPTIDE: CPT | Performed by: NURSE PRACTITIONER

## 2022-03-31 PROCEDURE — 93010 EKG 12-LEAD: ICD-10-PCS | Mod: ,,, | Performed by: INTERNAL MEDICINE

## 2022-03-31 PROCEDURE — 93010 ELECTROCARDIOGRAM REPORT: CPT | Mod: ,,, | Performed by: INTERNAL MEDICINE

## 2022-03-31 PROCEDURE — 80053 COMPREHEN METABOLIC PANEL: CPT | Performed by: NURSE PRACTITIONER

## 2022-03-31 PROCEDURE — 63600175 PHARM REV CODE 636 W HCPCS: Performed by: EMERGENCY MEDICINE

## 2022-03-31 RX ORDER — NAPROXEN 500 MG/1
500 TABLET ORAL 2 TIMES DAILY PRN
Qty: 20 TABLET | Refills: 0 | Status: SHIPPED | OUTPATIENT
Start: 2022-03-31

## 2022-03-31 RX ORDER — KETOROLAC TROMETHAMINE 30 MG/ML
30 INJECTION, SOLUTION INTRAMUSCULAR; INTRAVENOUS
Status: COMPLETED | OUTPATIENT
Start: 2022-03-31 | End: 2022-03-31

## 2022-03-31 RX ADMIN — KETOROLAC TROMETHAMINE 30 MG: 30 INJECTION, SOLUTION INTRAMUSCULAR at 05:03

## 2022-03-31 RX ADMIN — IOHEXOL 100 ML: 350 INJECTION, SOLUTION INTRAVENOUS at 05:03

## 2022-03-31 NOTE — FIRST PROVIDER EVALUATION
Medical screening exam completed.  I have conducted a focused provider triage encounter, findings are as follows:    Brief history of present illness:  History of recent right sided pleural infusion. Pt had a thoracentesis and was admitted to this hospital last month. Pt reports continued pain to the right sided     There were no vitals filed for this visit.    Pertinent physical exam:  Vss, nad     Brief workup plan:  Labs, ekg, xray,     Preliminary workup initiated; this workup will be continued and followed by the physician or advanced practice provider that is assigned to the patient when roomed.

## 2022-03-31 NOTE — ED NOTES
Patient identifiers verified and correct for Maciel Miranda.    LOC: The patient is awake, alert and aware of environment with an appropriate affect, the patient is oriented x 3 and speaking appropriately.  APPEARANCE: Patient resting comfortably and in no acute distress, patient is clean and well groomed, patient's clothing is properly fastened.  SKIN: The skin is warm and dry, color consistent with ethnicity, patient has normal skin turgor and moist mucus membranes, skin intact, no breakdown or bruising noted.  MUSCULOSKELETAL: Patient moving all extremities spontaneously.  RESPIRATORY: Airway is open and patent, respirations are spontaneous.  CARDIAC: Patient has a normal rate, no periphreal edema noted, capillary refill < 3 seconds.  ABDOMEN: Soft and non tender to palpation. Pt reports abd pain with recent thoracentesis, pt feels like there is still fluid in abd

## 2022-03-31 NOTE — ED PROVIDER NOTES
SCRIBE #1 NOTE: I, Miller Llanos, am scribing for, and in the presence of, Jeanette Neely DO. I have scribed the entire note.       History     Chief Complaint   Patient presents with    Abdominal Pain     Pt states he has been having pain in bilateral abdominal area. Pt has history of pleural effusion. Pt states he last took antibiotics about a month ago and states those help with getting the fluid. Pt denies any pain currently.      Review of patient's allergies indicates:  No Known Allergies      History of Present Illness     HPI    3/31/2022, 5:04 PM  History obtained from the patient      History of Present Illness: Maciel Miranda is a 22 y.o. male patient with a PMHx of ADHD, sleep apnea, and pleural effusion who presents to the Emergency Department for evaluation of bilateral abdominal pain which onset gradually 2 weeks ago. Pt states that is condition has not been getting better after his thoracentesis procedure a month and a half ago. Dr. Mueller has told him that the pain is probably residual pain from the procedure. The pt has also been in a long car ride recently. Symptoms are constant and moderate in severity. No mitigating or exacerbating factors reported. Associated sxs include small sharp pain in the lower right side when breathing in, SOB, nausea, anxiety, and near syncope episodes. Patient denies any CP, fever, chills, weakness, V/D, dysuria, constipation, and all other sxs at this time. Prior Tx includes abx a month ago, and he states that those help with getting the fluid out. No further complaints or concerns at this time.  Patient denies any previous history of DVT or PE      Arrival mode: Personal vehicle    PCP: Primary Doctor No        Past Medical History:  Past Medical History:   Diagnosis Date    ADHD (attention deficit hyperactivity disorder)     primarily inattentive    Sleep apnea        Past Surgical History:  No past surgical history on file.      Family History:  Family  History   Problem Relation Age of Onset    Cancer Paternal Grandfather        Social History:  Social History     Tobacco Use    Smoking status: Former Smoker    Smokeless tobacco: Former User   Substance and Sexual Activity    Alcohol use: No    Drug use: No    Sexual activity: Never        Review of Systems     Review of Systems   Constitutional: Negative for chills and fever.   HENT: Negative for sore throat.    Respiratory: Positive for shortness of breath.    Cardiovascular: Negative for chest pain.   Gastrointestinal: Positive for abdominal pain and nausea. Negative for constipation, diarrhea and vomiting.   Genitourinary: Negative for dysuria.   Musculoskeletal: Positive for myalgias (small sharp pain in lower right side when breathing in). Negative for back pain.   Skin: Negative for rash.   Neurological: Positive for syncope (near syncopal episodes). Negative for weakness.   Hematological: Does not bruise/bleed easily.   Psychiatric/Behavioral: The patient is nervous/anxious.    All other systems reviewed and are negative.     Physical Exam     Initial Vitals [03/31/22 1614]   BP Pulse Resp Temp SpO2   126/78 100 19 98.4 °F (36.9 °C) 96 %      MAP       --          Physical Exam  Nursing Notes and Vital Signs Reviewed.  Constitutional: Patient is in no acute distress. Well-developed and well-nourished.  Head: Atraumatic. Normocephalic.  Eyes: PERRL. EOM intact. Conjunctivae are not pale. No scleral icterus.  ENT: Mucous membranes are moist. Oropharynx is clear and symmetric.    Neck: Supple. Full ROM. No lymphadenopathy.  Cardiovascular: Regular rate. Regular rhythm. No murmurs, rubs, or gallops. Distal pulses are 2+ and symmetric.  Pulmonary/Chest: No respiratory distress. Clear to auscultation bilaterally. No wheezing or rales.  Abdominal: Soft and non-distended.  There is no tenderness.  No rebound, guarding, or rigidity. Good bowel sounds.  Genitourinary: No CVA tenderness  Musculoskeletal: Moves  all extremities. No obvious deformities. No edema. No calf tenderness.  Skin: Warm and dry.  Neurological:  Alert, awake, and appropriate.  Normal speech.  No acute focal neurological deficits are appreciated. Cranial nerves 2-12 intact.  No focal deficits  Psychiatric: Normal affect. Good eye contact. Appropriate in content.     ED Course   Procedures  ED Vital Signs:  Vitals:    03/31/22 1614 03/31/22 1630 03/31/22 1718 03/31/22 1829   BP: 126/78  122/66 125/62   Pulse: 100 82 81 74   Resp: 19  16 16   Temp: 98.4 °F (36.9 °C)      TempSrc: Oral      SpO2: 96%  98% 99%   Weight: 124 kg (273 lb 5.9 oz)      Height: 6' (1.829 m)          Abnormal Lab Results:  Labs Reviewed   CBC W/ AUTO DIFFERENTIAL - Abnormal; Notable for the following components:       Result Value    MPV 8.4 (*)     Eos # 0.6 (*)     All other components within normal limits   COMPREHENSIVE METABOLIC PANEL - Abnormal; Notable for the following components:    ALT 48 (*)     All other components within normal limits   TROPONIN I   B-TYPE NATRIURETIC PEPTIDE        All Lab Results:  Results for orders placed or performed during the hospital encounter of 03/31/22   CBC auto differential   Result Value Ref Range    WBC 9.86 3.90 - 12.70 K/uL    RBC 5.50 4.60 - 6.20 M/uL    Hemoglobin 15.0 14.0 - 18.0 g/dL    Hematocrit 45.2 40.0 - 54.0 %    MCV 82 82 - 98 fL    MCH 27.3 27.0 - 31.0 pg    MCHC 33.2 32.0 - 36.0 g/dL    RDW 13.1 11.5 - 14.5 %    Platelets 407 150 - 450 K/uL    MPV 8.4 (L) 9.2 - 12.9 fL    Immature Granulocytes 0.3 0.0 - 0.5 %    Gran # (ANC) 5.4 1.8 - 7.7 K/uL    Immature Grans (Abs) 0.03 0.00 - 0.04 K/uL    Lymph # 3.0 1.0 - 4.8 K/uL    Mono # 0.8 0.3 - 1.0 K/uL    Eos # 0.6 (H) 0.0 - 0.5 K/uL    Baso # 0.07 0.00 - 0.20 K/uL    nRBC 0 0 /100 WBC    Gran % 54.5 38.0 - 73.0 %    Lymph % 30.3 18.0 - 48.0 %    Mono % 8.2 4.0 - 15.0 %    Eosinophil % 6.0 0.0 - 8.0 %    Basophil % 0.7 0.0 - 1.9 %    Differential Method Automated     Comprehensive metabolic panel   Result Value Ref Range    Sodium 141 136 - 145 mmol/L    Potassium 4.1 3.5 - 5.1 mmol/L    Chloride 106 95 - 110 mmol/L    CO2 24 23 - 29 mmol/L    Glucose 80 70 - 110 mg/dL    BUN 17 6 - 20 mg/dL    Creatinine 1.3 0.5 - 1.4 mg/dL    Calcium 9.7 8.7 - 10.5 mg/dL    Total Protein 8.3 6.0 - 8.4 g/dL    Albumin 4.2 3.5 - 5.2 g/dL    Total Bilirubin 0.5 0.1 - 1.0 mg/dL    Alkaline Phosphatase 76 55 - 135 U/L    AST 29 10 - 40 U/L    ALT 48 (H) 10 - 44 U/L    Anion Gap 11 8 - 16 mmol/L    eGFR if African American >60 >60 mL/min/1.73 m^2    eGFR if non African American >60 >60 mL/min/1.73 m^2   Troponin I #1   Result Value Ref Range    Troponin I <0.006 0.000 - 0.026 ng/mL   BNP   Result Value Ref Range    BNP <10 0 - 99 pg/mL         Imaging Results:  Imaging Results          CTA Chest Non-Coronary (PE Study) (Final result)  Result time 03/31/22 17:52:25    Final result by Rai Rascon MD (03/31/22 17:52:25)                 Impression:      No pulmonary embolism.  No dissection.  No pneumonia.  Small right-sided pleural effusion.    All CT scans   are performed using dose optimization techniques including the following: automated exposure control; adjustment of the mA and/or kV; use of iterative reconstruction technique.  Dose modulation was employed for ALARA by means of: Automated exposure control; adjustment of the mA and/or kV according to patient size (this includes techniques or standardized protocols for targeted exams where dose is matched to indication/reason for exam; i.e. extremities or head); and/or use of iterative reconstructive technique.      Electronically signed by: Abiodun Atwood  Date:    03/31/2022  Time:    17:52             Narrative:    EXAMINATION:  CTA CHEST NON CORONARY    CLINICAL HISTORY:  chest pain;    TECHNIQUE:  Low dose axial images, sagittal and coronal reformations were obtained from the thoracic inlet to the lung bases following the IV administration  of 100 mL of Omnipaque 350.  Contrast timing was optimized to evaluate the pulmonary arteries.  MIP images were performed.    COMPARISON:  None    FINDINGS:  No evidence for pulmonary embolism.  No evidence for aortic dissection or aneurysm.  Small right-sided pleural effusion.  Mild motion artifacts.  Poor enhancement of the aorta and pulmonary artery.  Suboptimal imaging.  Slight anterior fat containing hernia on the right with subsegmental atelectasis or scarring.                               X-Ray Chest PA And Lateral (Final result)  Result time 03/31/22 16:24:47    Final result by Young Salazar MD (03/31/22 16:24:47)                 Impression:      No acute findings.Improved since prior exam.      Electronically signed by: Young Salazar  Date:    03/31/2022  Time:    16:24             Narrative:    EXAMINATION:  XR CHEST PA AND LATERAL    CLINICAL HISTORY:  Chest Pain;    COMPARISON:  03/10/2022    FINDINGS:  Lungs are clear.  Previous infiltrate right lung base resolved.  Heart size within normal limits.No significant bony findings.                                 The EKG was ordered, reviewed, and independently interpreted by the ED provider.  Interpretation time: 16:35  Rate: 96 BPM  Rhythm: normal sinus rhythm  Interpretation: Normal axis. No ST segment elevation. No STEMI.         The Emergency Provider reviewed the vital signs and test results, which are outlined above.     ED Discussion       6:15 PM: Reassessed pt at this time. Discussed with pt all pertinent ED information and results. Discussed pt dx and plan of tx. Gave pt all f/u and return to the ED instructions. All questions and concerns were addressed at this time. Pt expresses understanding of information and instructions, and is comfortable with plan to discharge. Pt is stable for discharge.    I discussed with patient and/or family/caretaker that evaluation in the ED does not suggest any emergent or life threatening medical  conditions requiring immediate intervention beyond what was provided in the ED, and I believe patient is safe for discharge.  Regardless, an unremarkable evaluation in the ED does not preclude the development or presence of a serious of life threatening condition. As such, patient was instructed to return immediately for any worsening or change in current symptoms.         Medical Decision Making:   Clinical Tests:   Lab Tests: Ordered and Reviewed  Radiological Study: Ordered and Reviewed  Medical Tests: Ordered and Reviewed           ED Medication(s):  Medications   ketorolac injection 30 mg (30 mg Intramuscular Given 3/31/22 2174)   iohexoL (OMNIPAQUE 350) injection 100 mL (100 mLs Intravenous Given 3/31/22 0152)       Discharge Medication List as of 3/31/2022  6:17 PM      START taking these medications    Details   naproxen (NAPROSYN) 500 MG tablet Take 1 tablet (500 mg total) by mouth 2 (two) times daily as needed (pain)., Starting Thu 3/31/2022, Print              Follow-up Information     Mak Mueller MD In 2 days.    Specialty: Pulmonary Disease  Why: please call your pulmonologist about cancelling future CT scan as you  had 1 done in the ED.  Return to emergency department for fever, coughing up blood, fever, worsening pain or other concerns.  Contact information:  [address]  [place] [address] [phone]                             Scribe Attestation:   Scribe #1: I performed the above scribed service and the documentation accurately describes the services I performed. I attest to the accuracy of the note.     Attending:   Physician Attestation Statement for Scribe #1: I, Jeanette Neely DO, personally performed the services described in this documentation, as scribed by Miller Llanos, in my presence, and it is both accurate and complete.           Clinical Impression       ICD-10-CM ICD-9-CM   1. Pleurisy  R09.1 511.0   2. Chest pain  R07.9 786.50       Disposition:   Disposition:  Discharged  Condition: Stable         Jeanette Neely, DO  03/31/22 [id number]

## 2022-04-03 LAB
ACID FAST MOD KINY STN SPEC: NORMAL
MYCOBACTERIUM SPEC QL CULT: NORMAL

## 2022-04-25 ENCOUNTER — TELEPHONE (OUTPATIENT)
Dept: CARDIOLOGY | Facility: CLINIC | Age: 22
End: 2022-04-25
Payer: MEDICAID

## 2022-04-25 ENCOUNTER — NURSE TRIAGE (OUTPATIENT)
Dept: ADMINISTRATIVE | Facility: CLINIC | Age: 22
End: 2022-04-25
Payer: MEDICAID

## 2022-04-25 ENCOUNTER — HOSPITAL ENCOUNTER (EMERGENCY)
Facility: HOSPITAL | Age: 22
Discharge: HOME | End: 2022-04-25
Attending: EMERGENCY MEDICINE
Payer: MEDICAID

## 2022-04-25 VITALS
WEIGHT: 276.69 LBS | OXYGEN SATURATION: 98 % | RESPIRATION RATE: 16 BRPM | SYSTOLIC BLOOD PRESSURE: 122 MMHG | TEMPERATURE: 98 F | BODY MASS INDEX: 37.52 KG/M2 | DIASTOLIC BLOOD PRESSURE: 88 MMHG | HEART RATE: 86 BPM

## 2022-04-25 DIAGNOSIS — J90 PLEURAL EFFUSION: Primary | ICD-10-CM

## 2022-04-25 DIAGNOSIS — R93.89 ABNORMAL CT OF THE CHEST: ICD-10-CM

## 2022-04-25 DIAGNOSIS — R07.9 CHEST PAIN: ICD-10-CM

## 2022-04-25 DIAGNOSIS — R91.8 RIGHT LOWER LOBE PULMONARY INFILTRATE: ICD-10-CM

## 2022-04-25 LAB
ALBUMIN SERPL BCP-MCNC: 4 G/DL (ref 3.5–5.2)
ALP SERPL-CCNC: 87 U/L (ref 55–135)
ALT SERPL W/O P-5'-P-CCNC: 35 U/L (ref 10–44)
ANION GAP SERPL CALC-SCNC: 9 MMOL/L (ref 8–16)
AST SERPL-CCNC: 19 U/L (ref 10–40)
BASOPHILS # BLD AUTO: 0.05 K/UL (ref 0–0.2)
BASOPHILS NFR BLD: 0.5 % (ref 0–1.9)
BILIRUB SERPL-MCNC: 0.3 MG/DL (ref 0.1–1)
BNP SERPL-MCNC: <10 PG/ML (ref 0–99)
BUN SERPL-MCNC: 14 MG/DL (ref 6–20)
CALCIUM SERPL-MCNC: 9.3 MG/DL (ref 8.7–10.5)
CHLORIDE SERPL-SCNC: 107 MMOL/L (ref 95–110)
CO2 SERPL-SCNC: 25 MMOL/L (ref 23–29)
CREAT SERPL-MCNC: 1 MG/DL (ref 0.5–1.4)
D DIMER PPP IA.FEU-MCNC: 0.85 MG/L FEU
DIFFERENTIAL METHOD: ABNORMAL
EOSINOPHIL # BLD AUTO: 0.4 K/UL (ref 0–0.5)
EOSINOPHIL NFR BLD: 4.3 % (ref 0–8)
ERYTHROCYTE [DISTWIDTH] IN BLOOD BY AUTOMATED COUNT: 13.4 % (ref 11.5–14.5)
EST. GFR  (AFRICAN AMERICAN): >60 ML/MIN/1.73 M^2
EST. GFR  (NON AFRICAN AMERICAN): >60 ML/MIN/1.73 M^2
GLUCOSE SERPL-MCNC: 108 MG/DL (ref 70–110)
HCT VFR BLD AUTO: 44.9 % (ref 40–54)
HGB BLD-MCNC: 14.8 G/DL (ref 14–18)
IMM GRANULOCYTES # BLD AUTO: 0.03 K/UL (ref 0–0.04)
IMM GRANULOCYTES NFR BLD AUTO: 0.3 % (ref 0–0.5)
LYMPHOCYTES # BLD AUTO: 2.5 K/UL (ref 1–4.8)
LYMPHOCYTES NFR BLD: 24.3 % (ref 18–48)
MCH RBC QN AUTO: 27.8 PG (ref 27–31)
MCHC RBC AUTO-ENTMCNC: 33 G/DL (ref 32–36)
MCV RBC AUTO: 84 FL (ref 82–98)
MONOCYTES # BLD AUTO: 0.7 K/UL (ref 0.3–1)
MONOCYTES NFR BLD: 6.8 % (ref 4–15)
NEUTROPHILS # BLD AUTO: 6.6 K/UL (ref 1.8–7.7)
NEUTROPHILS NFR BLD: 63.8 % (ref 38–73)
NRBC BLD-RTO: 0 /100 WBC
PLATELET # BLD AUTO: 343 K/UL (ref 150–450)
PMV BLD AUTO: 8.4 FL (ref 9.2–12.9)
POTASSIUM SERPL-SCNC: 4.3 MMOL/L (ref 3.5–5.1)
PROT SERPL-MCNC: 7.6 G/DL (ref 6–8.4)
RBC # BLD AUTO: 5.32 M/UL (ref 4.6–6.2)
SODIUM SERPL-SCNC: 141 MMOL/L (ref 136–145)
TROPONIN I SERPL DL<=0.01 NG/ML-MCNC: <0.006 NG/ML (ref 0–0.03)
WBC # BLD AUTO: 10.26 K/UL (ref 3.9–12.7)

## 2022-04-25 PROCEDURE — 83880 ASSAY OF NATRIURETIC PEPTIDE: CPT | Performed by: EMERGENCY MEDICINE

## 2022-04-25 PROCEDURE — 84484 ASSAY OF TROPONIN QUANT: CPT | Performed by: EMERGENCY MEDICINE

## 2022-04-25 PROCEDURE — 25500020 PHARM REV CODE 255: Performed by: EMERGENCY MEDICINE

## 2022-04-25 PROCEDURE — 93005 ELECTROCARDIOGRAM TRACING: CPT

## 2022-04-25 PROCEDURE — 96374 THER/PROPH/DIAG INJ IV PUSH: CPT | Mod: 59

## 2022-04-25 PROCEDURE — 63600175 PHARM REV CODE 636 W HCPCS: Performed by: EMERGENCY MEDICINE

## 2022-04-25 PROCEDURE — 93010 EKG 12-LEAD: ICD-10-PCS | Mod: ,,, | Performed by: INTERNAL MEDICINE

## 2022-04-25 PROCEDURE — 85025 COMPLETE CBC W/AUTO DIFF WBC: CPT | Performed by: EMERGENCY MEDICINE

## 2022-04-25 PROCEDURE — 80053 COMPREHEN METABOLIC PANEL: CPT | Performed by: EMERGENCY MEDICINE

## 2022-04-25 PROCEDURE — 99285 EMERGENCY DEPT VISIT HI MDM: CPT | Mod: 25

## 2022-04-25 PROCEDURE — 85379 FIBRIN DEGRADATION QUANT: CPT | Performed by: EMERGENCY MEDICINE

## 2022-04-25 PROCEDURE — 93010 ELECTROCARDIOGRAM REPORT: CPT | Mod: ,,, | Performed by: INTERNAL MEDICINE

## 2022-04-25 RX ORDER — AMOXICILLIN AND CLAVULANATE POTASSIUM 875; 125 MG/1; MG/1
1 TABLET, FILM COATED ORAL 2 TIMES DAILY
Qty: 14 TABLET | Refills: 0 | Status: SHIPPED | OUTPATIENT
Start: 2022-04-25 | End: 2022-10-05

## 2022-04-25 RX ORDER — ACETAMINOPHEN 500 MG
1000 TABLET ORAL
Status: DISCONTINUED | OUTPATIENT
Start: 2022-04-25 | End: 2022-04-25 | Stop reason: HOSPADM

## 2022-04-25 RX ORDER — KETOROLAC TROMETHAMINE 30 MG/ML
15 INJECTION, SOLUTION INTRAMUSCULAR; INTRAVENOUS
Status: COMPLETED | OUTPATIENT
Start: 2022-04-25 | End: 2022-04-25

## 2022-04-25 RX ORDER — AZITHROMYCIN 250 MG/1
250 TABLET, FILM COATED ORAL DAILY
Qty: 6 TABLET | Refills: 0 | Status: SHIPPED | OUTPATIENT
Start: 2022-04-25

## 2022-04-25 RX ADMIN — KETOROLAC TROMETHAMINE 15 MG: 30 INJECTION, SOLUTION INTRAMUSCULAR at 03:04

## 2022-04-25 RX ADMIN — IOHEXOL 100 ML: 350 INJECTION, SOLUTION INTRAVENOUS at 01:04

## 2022-04-25 NOTE — TELEPHONE ENCOUNTER
[place]    PCP:  None    Pt is calling regarding the status of the C-Pap equipment.  MD office is the best resource for this information.  Per protocol, care advised is  when office is open.  Instructed pt that NOC would see if [facility] is still available and return call to him but he needs to call his Specialists office tomorrow when they are open to check the status of the C-Pap equipment.  [facility] is closed.  Called pt back to let him know and NA; LM on VM; # verified.  Pt contacted and notified that [facility] also closed so he needs to contact the MD tomorrow when the office is open.  Instructed to call for questions/concerns.  VU.  Message routed to MD.    Reason for Disposition   [1] Caller requesting NON-URGENT health information AND [2] PCP's office is the best resource    Additional Information   Negative: [1] Caller is not with the adult (patient) AND [2] reporting urgent symptoms   Negative: Lab result questions   Negative: Medication questions   Negative: Caller can't be reached by phone   Negative: Caller has already spoken to PCP or another triager   Negative: RN needs further essential information from caller in order to complete triage   Negative: Requesting regular office appointment    Protocols used: INFORMATION ONLY CALL - NO TRIAGE-A-

## 2022-04-25 NOTE — TELEPHONE ENCOUNTER
Pt was instructed to see Dr. Watkins in 2 days. He wanted the first available appt. He scheduled with Dr Marks since Dr. Watkins's first available is on May 19.

## 2022-04-25 NOTE — ED NOTES
Patient changed into hospital gown and placed on continuous pulse ox, blood pressure, and cardiac monitor. Call light within reach of patient.

## 2022-04-25 NOTE — ED PROVIDER NOTES
SCRIBE #1 NOTE: I, Ravinder Castro, am scribing for, and in the presence of, Carrillo Miranda MD. I have scribed the entire note.      History      Chief Complaint   Patient presents with    Chest Pain     Sharp chest pain on waking at 0400 today; worse with lying down and with breathing in; been seen here twice previously for same complaints       Review of patient's allergies indicates:  No Known Allergies     HPI   HPI    4/25/2022, 11:39 AM   History obtained from the patient      History of Present Illness: Maciel Miranda is a 22 y.o. male patient who presents to the Emergency Department for intermittent CP which onset gradually two months ago, worsening last night at 4 AM. On 2/11/22, pt was dx with pleural effusion on right with a thoracentesis performed by Dr. Mueller (pulmonary disease) and rx levofloxacin with relief; pt is followed by IVETT Velazquez (pulmonary disease) with last visit on 3/10. Pt has been seen at the ED twice previously for similar complaints with last visit on 3/31. For the past 3 weeks, pt denies any significant problems with breathing; last night, pt c/o SOB, and sharp pain (upper chest) with breathing in and worse with laying down. Symptoms are intermittent and moderate in severity. Patient denies any fever, chills, leg pain/swelling, extremity weakness/numbness, HA, and all other sxs at this time. No prior Tx included. No further complaints or concerns at this time.       Arrival mode: Personal vehicle     PCP: Primary Doctor No       Past Medical History:  Past Medical History:   Diagnosis Date    ADHD (attention deficit hyperactivity disorder)     primarily inattentive    Sleep apnea        Past Surgical History:  History reviewed. No pertinent surgical history.       Family History:  Family History   Problem Relation Age of Onset    Cancer Paternal Grandfather        Social History:  Social History     Tobacco Use    Smoking status: Former Smoker    Smokeless  tobacco: Former User   Substance and Sexual Activity    Alcohol use: No    Drug use: No    Sexual activity: Never       ROS   Review of Systems   Constitutional: Negative for chills and fever.   HENT: Negative for sore throat.    Respiratory: Positive for shortness of breath.    Cardiovascular: Positive for chest pain (intermittent; upper chest). Negative for leg swelling.   Gastrointestinal: Negative for nausea.   Genitourinary: Negative for dysuria.   Musculoskeletal: Negative for back pain.        (-) leg pain   Skin: Negative for rash.   Neurological: Negative for weakness, numbness and headaches.   Hematological: Does not bruise/bleed easily.   All other systems reviewed and are negative.    Physical Exam      Initial Vitals [04/25/22 1041]   BP Pulse Resp Temp SpO2   (!) 140/73 90 16 98.1 °F (36.7 °C) 97 %      MAP       --          Physical Exam  Nursing Notes and Vital Signs Reviewed.  Constitutional: Patient is in no acute distress. Well-developed and well-nourished.  Head: Atraumatic. Normocephalic.  Eyes: PERRL. EOM intact. Conjunctivae are not pale. No scleral icterus.  ENT: Mucous membranes are moist. Oropharynx is clear and symmetric.    Neck: Supple. Full ROM. No lymphadenopathy.  Cardiovascular: Regular rate. Regular rhythm. No murmurs, rubs, or gallops. Distal pulses are 2+ and symmetric.  Pulmonary/Chest: No respiratory distress. Mildly decreased in right lung base. No wheezing or rales.  Abdominal: Soft and non-distended.  There is no tenderness.  No rebound, guarding, or rigidity. Good bowel sounds.  Genitourinary: No CVA tenderness  Musculoskeletal: Moves all extremities. No obvious deformities. No edema. No calf tenderness.  Skin: Warm and dry.  Neurological:  Alert, awake, and appropriate.  Normal speech.  No acute focal neurological deficits are appreciated.  Psychiatric: Normal affect. Good eye contact. Appropriate in content.    ED Course    Procedures  ED Vital Signs:  Vitals:     04/25/22 1041 04/25/22 1230 04/25/22 1540   BP: (!) 140/73 136/72 122/88   Pulse: 90 88 86   Resp: 16 16 16   Temp: 98.1 °F (36.7 °C) 98.1 °F (36.7 °C) 98.2 °F (36.8 °C)   TempSrc: Oral Oral Oral   SpO2: 97% 98% 98%   Weight: 125.5 kg (276 lb 10.8 oz)         Abnormal Lab Results:  Labs Reviewed   D DIMER, QUANTITATIVE - Abnormal; Notable for the following components:       Result Value    D-Dimer 0.85 (*)     All other components within normal limits   CBC W/ AUTO DIFFERENTIAL - Abnormal; Notable for the following components:    MPV 8.4 (*)     All other components within normal limits   COMPREHENSIVE METABOLIC PANEL   TROPONIN I   B-TYPE NATRIURETIC PEPTIDE        All Lab Results:  Results for orders placed or performed during the hospital encounter of 04/25/22   D dimer, quantitative   Result Value Ref Range    D-Dimer 0.85 (H) <0.50 mg/L FEU   CBC auto differential   Result Value Ref Range    WBC 10.26 3.90 - 12.70 K/uL    RBC 5.32 4.60 - 6.20 M/uL    Hemoglobin 14.8 14.0 - 18.0 g/dL    Hematocrit 44.9 40.0 - 54.0 %    MCV 84 82 - 98 fL    MCH 27.8 27.0 - 31.0 pg    MCHC 33.0 32.0 - 36.0 g/dL    RDW 13.4 11.5 - 14.5 %    Platelets 343 150 - 450 K/uL    MPV 8.4 (L) 9.2 - 12.9 fL    Immature Granulocytes 0.3 0.0 - 0.5 %    Gran # (ANC) 6.6 1.8 - 7.7 K/uL    Immature Grans (Abs) 0.03 0.00 - 0.04 K/uL    Lymph # 2.5 1.0 - 4.8 K/uL    Mono # 0.7 0.3 - 1.0 K/uL    Eos # 0.4 0.0 - 0.5 K/uL    Baso # 0.05 0.00 - 0.20 K/uL    nRBC 0 0 /100 WBC    Gran % 63.8 38.0 - 73.0 %    Lymph % 24.3 18.0 - 48.0 %    Mono % 6.8 4.0 - 15.0 %    Eosinophil % 4.3 0.0 - 8.0 %    Basophil % 0.5 0.0 - 1.9 %    Differential Method Automated    Comprehensive metabolic panel   Result Value Ref Range    Sodium 141 136 - 145 mmol/L    Potassium 4.3 3.5 - 5.1 mmol/L    Chloride 107 95 - 110 mmol/L    CO2 25 23 - 29 mmol/L    Glucose 108 70 - 110 mg/dL    BUN 14 6 - 20 mg/dL    Creatinine 1.0 0.5 - 1.4 mg/dL    Calcium 9.3 8.7 - 10.5 mg/dL     Total Protein 7.6 6.0 - 8.4 g/dL    Albumin 4.0 3.5 - 5.2 g/dL    Total Bilirubin 0.3 0.1 - 1.0 mg/dL    Alkaline Phosphatase 87 55 - 135 U/L    AST 19 10 - 40 U/L    ALT 35 10 - 44 U/L    Anion Gap 9 8 - 16 mmol/L    eGFR if African American >60 >60 mL/min/1.73 m^2    eGFR if non African American >60 >60 mL/min/1.73 m^2   Troponin I   Result Value Ref Range    Troponin I <0.006 0.000 - 0.026 ng/mL   Brain natriuretic peptide   Result Value Ref Range    BNP <10 0 - 99 pg/mL       Imaging Results:  Imaging Results          CTA Chest Non-Coronary (PE Study) (Final result)  Result time 04/25/22 14:02:29    Final result by Mike Bernstein MD (04/25/22 14:02:29)                 Impression:      No evidence of central or segmental pulmonary embolism.  Limited evaluation.    Small right pleural effusion with mild interstitial thickening ground-glass opacities within the right lower lobe which could reflect infectious or inflammatory pneumonitis.    See additional findings above    All CT scans at this facility use dose modulation, iterative reconstruction and/or weight based dosing when appropriate to reduce radiation dose to as low as reasonably achievable.      Electronically signed by: Mike Bernstein MD  Date:    04/25/2022  Time:    14:02             Narrative:    EXAMINATION:  CTA CHEST NON CORONARY    CLINICAL HISTORY:  Chest pain and shortness of breath    TECHNIQUE:  After the intravenous administration of 100 cc of Omni 350 nonionic contrast using CT pulmonary angio technique, 1.25  Mm axial images were acquired using helical CT technique from the lung apices through costophrenic sulci.  Sagittal coronal and oblique MIPS were also submitted for interpretation.    COMPARISON:  Chest x-ray dated 04/25/2022    FINDINGS:  -Pulmonary arteries: Pulmonary arteries are suboptimally opacified.  No evidence of pulmonary embolism.  No evidence of pulmonary hypertension.  No right heart strain is identified with RV/LV ratio <  0.9.    -Lungs/pleura: No nodules.  Small right pleural effusion with mild interstitial thickening ground-glass opacities within the right lower lobe which could reflect infectious or inflammatory pneumonitis.  Mild atelectasis also seen within the right middle lobe.    -Mediastinum/Chelsea:Shotty adenopathy    -Axilla: No adenopathy.    -Thyroid: Normal lower gland.    -Heart/Aorta: Heart size is normal.  Moderate  coronary artery disease.  No pericardial effusion. Aorta normal caliber.   Aorta demonstrates moderate atherosclerotic disease.    -Bones/Chest Wall: Intact    -Upper Abdomen: Unremarkable with mild constipation.  Mild hepatomegaly.  Small hiatal hernia.                               X-Ray Chest AP Portable (Final result)  Result time 04/25/22 12:21:55    Final result by Ye Colvin MD (04/25/22 12:21:55)                 Impression:      1.  Negative for acute process involving the chest.    2.  Stable findings as noted above.      Electronically signed by: Ye Colvin MD  Date:    04/25/2022  Time:    12:21             Narrative:    EXAMINATION:  XR CHEST AP PORTABLE    CLINICAL HISTORY:  Chest pain;    COMPARISON:  March 31, 2022    FINDINGS:  EKG leads overlie the chest.  Stable minimal scarring or atelectasis in the right lung base.  The lungs are free of new pulmonary opacities.  The cardiac silhouette size is normal. The trachea is midline and the mediastinal width is normal. Negative for focal infiltrate, effusion or pneumothorax. Pulmonary vasculature is normal. Negative for osseous abnormalities. Tortuous aorta.  Minimal convex right curvature of the thoracic spine.                               The EKG was ordered, reviewed, and independently interpreted by the ED provider.  Interpretation time: 10:39  Rate: 96 BPM  Rhythm: normal sinus rhythm  Interpretation: No acute ST changes. No STEMI.         The Emergency Provider reviewed the vital signs and test results, which are outlined  above.    ED Discussion     3:01 PM: Reassessed pt at this time. Discussed with pt all pertinent ED information and results. Discussed pt dx and plan of tx. Gave pt all f/u and return to the ED instructions. All questions and concerns were addressed at this time. Pt expresses understanding of information and instructions, and is comfortable with plan to discharge. Pt is stable for discharge.    I discussed with patient and/or family/caretaker that evaluation in the ED does not suggest any emergent or life threatening medical conditions requiring immediate intervention beyond what was provided in the ED, and I believe patient is safe for discharge.  Regardless, an unremarkable evaluation in the ED does not preclude the development or presence of a serious of life threatening condition. As such, patient was instructed to return immediately for any worsening or change in current symptoms.    ED Course as of 04/25/22 2207   Mon Apr 25, 2022   1523 Troponin I: <0.006 [BA]      ED Course User Index  [BA] Carrillo Miranda MD       ED Medication(s):  Medications   iohexoL (OMNIPAQUE 350) injection 100 mL (100 mLs Intravenous Given 4/25/22 1353)   ketorolac injection 15 mg (15 mg Intravenous Given 4/25/22 1508)     Discharge Medication List as of 4/25/2022  3:23 PM      START taking these medications    Details   amoxicillin-clavulanate 875-125mg (AUGMENTIN) 875-125 mg per tablet Take 1 tablet by mouth 2 (two) times daily., Starting Mon 4/25/2022, Print      azithromycin (Z-CHARMAINE) 250 MG tablet Take 1 tablet (250 mg total) by mouth once daily. Take first 2 tablets together, then 1 every day until finished., Starting Mon 4/25/2022, Print              Medication List      START taking these medications    amoxicillin-clavulanate 875-125mg 875-125 mg per tablet  Commonly known as: AUGMENTIN  Take 1 tablet by mouth 2 (two) times daily.     azithromycin 250 MG tablet  Commonly known as: Z-CHARMAINE  Take 1 tablet (250 mg total) by mouth  once daily. Take first 2 tablets together, then 1 every day until finished.        ASK your doctor about these medications    naproxen 500 MG tablet  Commonly known as: NAPROSYN  Take 1 tablet (500 mg total) by mouth 2 (two) times daily as needed (pain).     tretinoin 0.025 % gel  Commonly known as: RETIN-A  Apply topically nightly.     VYVANSE 30 MG capsule  Generic drug: lisdexamfetamine           Where to Get Your Medications      You can get these medications from any pharmacy    Bring a paper prescription for each of these medications  · amoxicillin-clavulanate 875-125mg 875-125 mg per tablet  · azithromycin 250 MG tablet        Follow-up Information     Mak Mueller MD. Schedule an appointment as soon as possible for a visit in 2 days.    Specialty: Pulmonary Disease  Why: For re-evaluation and further treatment  Contact information:  [address]  [place] [address]  [phone]             [facility] - Emergency Dept.. Go today.    Specialty: Emergency Medicine  Why: If symptoms worsen, For re-evaluation and further treatment, As needed  Contact information:  [id number] [place] [id number] [phone]           Abdirahman Watkins Md, MD. Schedule an appointment as soon as possible for a visit in 2 days.    Specialties: Cardiology, Internal Medicine  Why: For re-evaluation and further treatment  Contact information:  [address]  [place] [address]  [phone]                           Medical Decision Making    Medical Decision Making:   Clinical Tests:   Lab Tests: Ordered and Reviewed  Radiological Study: Ordered and Reviewed  Medical Tests: Ordered and Reviewed           Scribe Attestation:   Scribe #1: I performed the above scribed service and the documentation accurately describes the services I performed. I attest to the accuracy of the note.    Attending:   Physician Attestation Statement for Scribe #1: I, Carrillo Miranda MD, personally performed the  services described in this documentation, as scribed by Ravinder Castro, in my presence, and it is both accurate and complete.          Clinical Impression       ICD-10-CM ICD-9-CM   1. Pleural effusion  J90 511.9   2. Chest pain  R07.9 786.50   3. Right lower lobe pulmonary infiltrate  R91.8 793.19   4. Abnormal CT of the chest  R93.89 793.2       Disposition:   Disposition: Discharged  Condition: Stable         Carrillo Miranda MD  04/25/22 5295

## 2022-05-05 ENCOUNTER — OFFICE VISIT (OUTPATIENT)
Dept: CARDIOLOGY | Facility: CLINIC | Age: 22
End: 2022-05-05
Payer: MEDICAID

## 2022-05-05 VITALS
BODY MASS INDEX: 35.91 KG/M2 | WEIGHT: 264.75 LBS | DIASTOLIC BLOOD PRESSURE: 72 MMHG | HEART RATE: 88 BPM | SYSTOLIC BLOOD PRESSURE: 116 MMHG | OXYGEN SATURATION: 98 %

## 2022-05-05 DIAGNOSIS — R07.89 OTHER CHEST PAIN: ICD-10-CM

## 2022-05-05 DIAGNOSIS — F90.8 ATTENTION DEFICIT HYPERACTIVITY DISORDER (ADHD), OTHER TYPE: Primary | ICD-10-CM

## 2022-05-05 DIAGNOSIS — J90 PLEURAL EFFUSION, NOT ELSEWHERE CLASSIFIED: ICD-10-CM

## 2022-05-05 PROCEDURE — 3008F PR BODY MASS INDEX (BMI) DOCUMENTED: ICD-10-PCS | Mod: CPTII,,, | Performed by: INTERNAL MEDICINE

## 2022-05-05 PROCEDURE — 99204 OFFICE O/P NEW MOD 45 MIN: CPT | Mod: S$PBB,,, | Performed by: INTERNAL MEDICINE

## 2022-05-05 PROCEDURE — 3008F BODY MASS INDEX DOCD: CPT | Mod: CPTII,,, | Performed by: INTERNAL MEDICINE

## 2022-05-05 PROCEDURE — 99204 PR OFFICE/OUTPT VISIT, NEW, LEVL IV, 45-59 MIN: ICD-10-PCS | Mod: S$PBB,,, | Performed by: INTERNAL MEDICINE

## 2022-05-05 PROCEDURE — 3078F PR MOST RECENT DIASTOLIC BLOOD PRESSURE < 80 MM HG: ICD-10-PCS | Mod: CPTII,,, | Performed by: INTERNAL MEDICINE

## 2022-05-05 PROCEDURE — 1160F PR REVIEW ALL MEDS BY PRESCRIBER/CLIN PHARMACIST DOCUMENTED: ICD-10-PCS | Mod: CPTII,,, | Performed by: INTERNAL MEDICINE

## 2022-05-05 PROCEDURE — 99999 PR PBB SHADOW E&M-EST. PATIENT-LVL III: CPT | Mod: PBBFAC,,, | Performed by: INTERNAL MEDICINE

## 2022-05-05 PROCEDURE — 99213 OFFICE O/P EST LOW 20 MIN: CPT | Mod: PBBFAC | Performed by: INTERNAL MEDICINE

## 2022-05-05 PROCEDURE — 1160F RVW MEDS BY RX/DR IN RCRD: CPT | Mod: CPTII,,, | Performed by: INTERNAL MEDICINE

## 2022-05-05 PROCEDURE — 1159F MED LIST DOCD IN RCRD: CPT | Mod: CPTII,,, | Performed by: INTERNAL MEDICINE

## 2022-05-05 PROCEDURE — 3074F SYST BP LT 130 MM HG: CPT | Mod: CPTII,,, | Performed by: INTERNAL MEDICINE

## 2022-05-05 PROCEDURE — 1159F PR MEDICATION LIST DOCUMENTED IN MEDICAL RECORD: ICD-10-PCS | Mod: CPTII,,, | Performed by: INTERNAL MEDICINE

## 2022-05-05 PROCEDURE — 3078F DIAST BP <80 MM HG: CPT | Mod: CPTII,,, | Performed by: INTERNAL MEDICINE

## 2022-05-05 PROCEDURE — 99999 PR PBB SHADOW E&M-EST. PATIENT-LVL III: ICD-10-PCS | Mod: PBBFAC,,, | Performed by: INTERNAL MEDICINE

## 2022-05-05 PROCEDURE — 3074F PR MOST RECENT SYSTOLIC BLOOD PRESSURE < 130 MM HG: ICD-10-PCS | Mod: CPTII,,, | Performed by: INTERNAL MEDICINE

## 2022-05-05 NOTE — PROGRESS NOTES
Subjective:   Patient ID:  Maciel Miranda is a 22 y.o. male who presents for evaluation of No chief complaint on file.      23 yo male, referred for chest pain.  PMH ADHD. [employer] student. Former smoking. Drinking occasionally.   h/o PNA and right pleural effusion and pleurisy chest pain  Repeat chest pain and ER visit in 03 and ,   Reviewed chest CT without contrast in  showed no aortic and coronary calcification visualized.  Twice chest CTA in 03 and 04-22, no PE.   ekg NSR and no acute stt change  Troponin and BNP wnl.  Right side chest pain resolved   Exercise 30 min cardio daily and no chest pain dizziness and faint  Mild ROLAND.   No f/h premature CAD        Past Medical History:   Diagnosis Date    ADHD (attention deficit hyperactivity disorder)     primarily inattentive    Sleep apnea        History reviewed. No pertinent surgical history.    Social History     Tobacco Use    Smoking status: Former Smoker    Smokeless tobacco: Former User   Substance Use Topics    Alcohol use: No    Drug use: No       Family History   Problem Relation Age of Onset    Cancer Paternal Grandfather        Review of Systems   Constitutional: Negative for decreased appetite, diaphoresis, fever, malaise/fatigue and night sweats.   HENT: Negative for nosebleeds.    Eyes: Negative for blurred vision and double vision.   Cardiovascular: Negative for chest pain, claudication, dyspnea on exertion, irregular heartbeat, leg swelling, near-syncope, orthopnea, palpitations, paroxysmal nocturnal dyspnea and syncope.   Respiratory: Negative for cough, shortness of breath, sleep disturbances due to breathing, snoring, sputum production and wheezing.    Endocrine: Negative for cold intolerance and polyuria.   Hematologic/Lymphatic: Does not bruise/bleed easily.   Skin: Negative for rash.   Musculoskeletal: Negative for back pain, falls, joint pain, joint swelling and neck pain.   Gastrointestinal: Negative for  abdominal pain, heartburn, nausea and vomiting.   Genitourinary: Negative for dysuria, frequency and hematuria.   Neurological: Negative for difficulty with concentration, dizziness, focal weakness, headaches, light-headedness, numbness, seizures and weakness.   Psychiatric/Behavioral: Negative for depression, memory loss and substance abuse. The patient does not have insomnia.    Allergic/Immunologic: Negative for HIV exposure and hives.       Objective:   Physical Exam  HENT:      Head: Normocephalic.   Eyes:      Pupils: Pupils are equal, round, and reactive to light.   Neck:      Thyroid: No thyromegaly.      Vascular: Normal carotid pulses. No carotid bruit or JVD.   Cardiovascular:      Rate and Rhythm: Normal rate and regular rhythm.  No extrasystoles are present.     Chest Wall: PMI is not displaced.      Pulses: Normal pulses.      Heart sounds: Normal heart sounds. No murmur heard.    No gallop. No S3 sounds.   Pulmonary:      Effort: No respiratory distress.      Breath sounds: Normal breath sounds. No stridor.   Abdominal:      General: Bowel sounds are normal.      Palpations: Abdomen is soft.      Tenderness: There is no abdominal tenderness. There is no rebound.   Musculoskeletal:         General: Normal range of motion.   Skin:     Findings: No rash.   Neurological:      Mental Status: He is alert and oriented to person, place, and time.   Psychiatric:         Behavior: Behavior normal.         No results found for: CHOL  No results found for: HDL  No results found for: LDLCALC  No results found for: TRIG  No results found for: CHOLHDL    Chemistry        Component Value Date/Time     04/25/2022 1157    K 4.3 04/25/2022 1157     04/25/2022 1157    CO2 25 04/25/2022 1157    BUN 14 04/25/2022 1157    CREATININE 1.0 04/25/2022 1157     04/25/2022 1157        Component Value Date/Time    CALCIUM 9.3 04/25/2022 1157    ALKPHOS 87 04/25/2022 1157    AST 19 04/25/2022 1157    ALT 35  04/25/2022 1157    BILITOT 0.3 04/25/2022 1157    ESTGFRAFRICA >60 04/25/2022 1157    EGFRNONAA >60 04/25/2022 1157          No results found for: LABA1C, HGBA1C  No results found for: TSH  No results found for: INR, PROTIME  Lab Results   Component Value Date    WBC 10.26 04/25/2022    HGB 14.8 04/25/2022    HCT 44.9 04/25/2022    MCV 84 04/25/2022     04/25/2022     BNP  @LABRCNTIP(BNP,BNPTRIAGEBLO)@  CrCl cannot be calculated (Patient's most recent lab result is older than the maximum 7 days allowed.).  No results found in the last 24 hours.  No results found in the last 24 hours.  No results found in the last 24 hours.    Assessment:      1. Attention deficit hyperactivity disorder (ADHD), other type    2. Pleural effusion, not elsewhere classified    3. BMI 36.0-36.9,adult    4. Other chest pain      Chest pain due to pleural effusion and PNA, now resolved  Chest ct w/o contrast done in   No coronary and aorta calcification'  Plan:     Counseled DASH  Recommend heart-healthy diet, weight control and regular exercise.  Rui. Risk modification.   I have reviewed all pertinent labs and cardiac studies independently. Plans and recommendations have been formulated under my direct supervision. All questions answered and patient voiced understanding.   If symptoms persist go to the ED  F/u with PCP

## 2022-05-07 PROBLEM — R07.89 OTHER CHEST PAIN: Status: ACTIVE | Noted: 2022-05-07

## 2022-05-09 ENCOUNTER — TELEPHONE (OUTPATIENT)
Dept: PULMONOLOGY | Facility: CLINIC | Age: 22
End: 2022-05-09
Payer: MEDICAID

## 2022-05-09 NOTE — TELEPHONE ENCOUNTER
spoke with Mr. Miranda about appt 7/11 with [facility]    ----- Message from Makenzie Dickson sent at 5/9/2022  4:01 PM CDT -----  Regarding: PAP Follow Up  Hey!    Patient received PAP Equipment today. Please call him to schedule PAP Follow Up with [facility] for 30-90 days from today. It needs to be within this time period for insurance compliance.     Thank you!

## 2022-05-09 NOTE — TELEPHONE ENCOUNTER
spoke with Mr. Miranda about his upcoming appt. May 24th at [facility] with [facility] for 9:00am    ----- Message from Jaci Gan sent at 5/9/2022  1:04 PM CDT -----  Contact: self/[phone]  Type:  Sooner Apoointment Request    Caller is requesting a sooner appointment.  Caller declined first available appointment listed below.  Caller will not accept being placed on the waitlist and is requesting a message be sent to doctor.  Name of Caller:Maciel Miranda  When is the first available appointment?2022  Symptoms:follow-up from the hospital  Would the patient rather a call back or a response [facility]? Call back   Best Call Back Number:[phone]  Additional Information:

## 2022-05-24 ENCOUNTER — OFFICE VISIT (OUTPATIENT)
Dept: PULMONOLOGY | Facility: CLINIC | Age: 22
End: 2022-05-24
Payer: MEDICAID

## 2022-05-24 VITALS
WEIGHT: 264.75 LBS | OXYGEN SATURATION: 98 % | SYSTOLIC BLOOD PRESSURE: 116 MMHG | DIASTOLIC BLOOD PRESSURE: 74 MMHG | BODY MASS INDEX: 35.86 KG/M2 | HEART RATE: 85 BPM | RESPIRATION RATE: 12 BRPM | HEIGHT: 72 IN

## 2022-05-24 DIAGNOSIS — J90 PLEURAL EFFUSION, NOT ELSEWHERE CLASSIFIED: Primary | ICD-10-CM

## 2022-05-24 DIAGNOSIS — G47.33 OSA (OBSTRUCTIVE SLEEP APNEA): ICD-10-CM

## 2022-05-24 DIAGNOSIS — F90.8 ATTENTION DEFICIT HYPERACTIVITY DISORDER (ADHD), OTHER TYPE: ICD-10-CM

## 2022-05-24 PROBLEM — R07.89 OTHER CHEST PAIN: Status: RESOLVED | Noted: 2022-05-07 | Resolved: 2022-05-24

## 2022-05-24 PROCEDURE — 3008F BODY MASS INDEX DOCD: CPT | Mod: CPTII,,, | Performed by: PHYSICIAN ASSISTANT

## 2022-05-24 PROCEDURE — 3078F DIAST BP <80 MM HG: CPT | Mod: CPTII,,, | Performed by: PHYSICIAN ASSISTANT

## 2022-05-24 PROCEDURE — 99999 PR PBB SHADOW E&M-EST. PATIENT-LVL III: ICD-10-PCS | Mod: PBBFAC,,, | Performed by: PHYSICIAN ASSISTANT

## 2022-05-24 PROCEDURE — 1160F RVW MEDS BY RX/DR IN RCRD: CPT | Mod: CPTII,,, | Performed by: PHYSICIAN ASSISTANT

## 2022-05-24 PROCEDURE — 1160F PR REVIEW ALL MEDS BY PRESCRIBER/CLIN PHARMACIST DOCUMENTED: ICD-10-PCS | Mod: CPTII,,, | Performed by: PHYSICIAN ASSISTANT

## 2022-05-24 PROCEDURE — 99214 PR OFFICE/OUTPT VISIT, EST, LEVL IV, 30-39 MIN: ICD-10-PCS | Mod: S$PBB,,, | Performed by: PHYSICIAN ASSISTANT

## 2022-05-24 PROCEDURE — 1159F MED LIST DOCD IN RCRD: CPT | Mod: CPTII,,, | Performed by: PHYSICIAN ASSISTANT

## 2022-05-24 PROCEDURE — 1159F PR MEDICATION LIST DOCUMENTED IN MEDICAL RECORD: ICD-10-PCS | Mod: CPTII,,, | Performed by: PHYSICIAN ASSISTANT

## 2022-05-24 PROCEDURE — 3074F SYST BP LT 130 MM HG: CPT | Mod: CPTII,,, | Performed by: PHYSICIAN ASSISTANT

## 2022-05-24 PROCEDURE — 99999 PR PBB SHADOW E&M-EST. PATIENT-LVL III: CPT | Mod: PBBFAC,,, | Performed by: PHYSICIAN ASSISTANT

## 2022-05-24 PROCEDURE — 99213 OFFICE O/P EST LOW 20 MIN: CPT | Mod: PBBFAC | Performed by: PHYSICIAN ASSISTANT

## 2022-05-24 PROCEDURE — 3008F PR BODY MASS INDEX (BMI) DOCUMENTED: ICD-10-PCS | Mod: CPTII,,, | Performed by: PHYSICIAN ASSISTANT

## 2022-05-24 PROCEDURE — 3078F PR MOST RECENT DIASTOLIC BLOOD PRESSURE < 80 MM HG: ICD-10-PCS | Mod: CPTII,,, | Performed by: PHYSICIAN ASSISTANT

## 2022-05-24 PROCEDURE — 99214 OFFICE O/P EST MOD 30 MIN: CPT | Mod: S$PBB,,, | Performed by: PHYSICIAN ASSISTANT

## 2022-05-24 PROCEDURE — 3074F PR MOST RECENT SYSTOLIC BLOOD PRESSURE < 130 MM HG: ICD-10-PCS | Mod: CPTII,,, | Performed by: PHYSICIAN ASSISTANT

## 2022-05-24 NOTE — ASSESSMENT & PLAN NOTE
Resolved  Post PNA  pleurisy symptoms improving  Recommend taking naproxen for continued healing and pain relief

## 2022-05-24 NOTE — PROGRESS NOTES
Subjective:       Patient ID: Maciel Miranda is a 22 y.o. male.    Chief Complaint: hospital f/up      21yo male here for ER follow up  Went to [facility] 4/25 for pleural effusion  CTA negative for PE, positive for Small right pleural effusion with mild interstitial thickening ground-glass opacities within the right lower lobe which could reflect infectious or inflammatory pneumonitis.  Mild atelectasis also seen within the right middle lobe.  Given augmentin and azithrymycin  Also went to [facility] ER 5/11 for lingering pleurisy, cxr without effusion  He was given naproxen but has not taken  He says he feels like he is gradually getting better, denies any worsening pain  No SOB or fever  He is established with [facility] - Had thoracentesis for pleural effusion/PNA 2/2022, took levaquin  Non smoker, no alcohol, no illicit drugs  No personal or family history of cancer  Also recently started on CPAP 5/9/22, will return in a few weeks for initial compliance visit      Immunization History   Administered Date(s) Administered    HPV 9-Valent 08/26/2015    HPV Quadrivalent 07/09/2012, 06/17/2014    Meningococcal Conjugate (MCV4P) 08/26/2015    PPD Test 05/28/2013, 06/17/2014, 08/26/2015, 12/14/2016, 01/17/2017      Tobacco Use: Medium Risk    Smoking Tobacco Use: Former Smoker    Smokeless Tobacco Use: Former User      Past Medical History:   Diagnosis Date    ADHD (attention deficit hyperactivity disorder)     primarily inattentive    Sleep apnea       Current Outpatient Medications on File Prior to Visit   Medication Sig Dispense Refill    amoxicillin-clavulanate 875-125mg (AUGMENTIN) 875-125 mg per tablet Take 1 tablet by mouth 2 (two) times daily. (Patient not taking: No sig reported) 14 tablet 0    azithromycin (Z-CHARMAINE) 250 MG tablet Take 1 tablet (250 mg total) by mouth once daily. Take first 2 tablets together, then 1 every day until finished. (Patient not taking: No sig reported) 6 tablet 0     naproxen (NAPROSYN) 500 MG tablet Take 1 tablet (500 mg total) by mouth 2 (two) times daily as needed (pain). (Patient not taking: No sig reported) 20 tablet 0    tretinoin (RETIN-A) 0.025 % gel Apply topically nightly. 45 g 3    VYVANSE 30 mg capsule Take 30 mg by mouth every morning.       No current facility-administered medications on file prior to visit.        Review of Systems   Constitutional: Negative for fever, weight loss, appetite change, fatigue and weakness.   HENT: Negative for postnasal drip, rhinorrhea, sinus pressure, trouble swallowing and congestion.    Respiratory: Negative for cough, sputum production, choking, chest tightness, shortness of breath, wheezing and dyspnea on extertion.    Cardiovascular: Negative for chest pain and leg swelling.   Musculoskeletal: Negative for arthralgias, gait problem and joint swelling.   Gastrointestinal: Negative for nausea, vomiting and abdominal pain.   Neurological: Negative for dizziness, weakness and headaches.   All other systems reviewed and are negative.      Objective:       Vitals:    05/24/22 0901   BP: 116/74   Pulse: 85   Resp: 12   SpO2: 98%   Weight: 120.1 kg (264 lb 12.4 oz)   Height: 6' (1.829 m)       Physical Exam   Constitutional: He is oriented to person, place, and time. He appears well-developed and well-nourished. No distress.   HENT:   Head: Normocephalic.   Nose: Nose normal.   Mouth/Throat: Oropharynx is clear and moist.   Cardiovascular: Normal rate and regular rhythm.   Pulmonary/Chest: Effort normal. No respiratory distress. He has no wheezes. He has no rhonchi. He has no rales.   Musculoskeletal:         General: No edema.      Cervical back: Normal range of motion and neck supple.   Lymphadenopathy: No supraclavicular adenopathy is present.     He has no cervical adenopathy.   Neurological: He is alert and oriented to person, place, and time. Gait normal.   Skin: Skin is warm and dry.   Psychiatric: He has a normal mood and  affect.   Vitals reviewed.    Personal Diagnostic Review    CTA Chest Non-Coronary (PE Study)  Narrative: EXAMINATION:  CTA CHEST NON CORONARY    CLINICAL HISTORY:  Chest pain and shortness of breath    TECHNIQUE:  After the intravenous administration of 100 cc of Omni 350 nonionic contrast using CT pulmonary angio technique, 1.25  Mm axial images were acquired using helical CT technique from the lung apices through costophrenic sulci.  Sagittal coronal and oblique MIPS were also submitted for interpretation.    COMPARISON:  Chest x-ray dated 04/25/2022    FINDINGS:  -Pulmonary arteries: Pulmonary arteries are suboptimally opacified.  No evidence of pulmonary embolism.  No evidence of pulmonary hypertension.  No right heart strain is identified with RV/LV ratio < 0.9.    -Lungs/pleura: No nodules.  Small right pleural effusion with mild interstitial thickening ground-glass opacities within the right lower lobe which could reflect infectious or inflammatory pneumonitis.  Mild atelectasis also seen within the right middle lobe.    -Mediastinum/Chelsea:Shotty adenopathy    -Axilla: No adenopathy.    -Thyroid: Normal lower gland.    -Heart/Aorta: Heart size is normal.  Moderate  coronary artery disease.  No pericardial effusion. Aorta normal caliber.   Aorta demonstrates moderate atherosclerotic disease.    -Bones/Chest Wall: Intact    -Upper Abdomen: Unremarkable with mild constipation.  Mild hepatomegaly.  Small hiatal hernia.  Impression: No evidence of central or segmental pulmonary embolism.  Limited evaluation.    Small right pleural effusion with mild interstitial thickening ground-glass opacities within the right lower lobe which could reflect infectious or inflammatory pneumonitis.    See additional findings above    All CT scans at this facility use dose modulation, iterative reconstruction and/or weight based dosing when appropriate to reduce radiation dose to as low as reasonably achievable.    Electronically  signed by: Mike Bernstein MD  Date:    04/25/2022  Time:    14:02  X-Ray Chest AP Portable  Narrative: EXAMINATION:  XR CHEST AP PORTABLE    CLINICAL HISTORY:  Chest pain;    COMPARISON:  March 31, 2022    FINDINGS:  EKG leads overlie the chest.  Stable minimal scarring or atelectasis in the right lung base.  The lungs are free of new pulmonary opacities.  The cardiac silhouette size is normal. The trachea is midline and the mediastinal width is normal. Negative for focal infiltrate, effusion or pneumothorax. Pulmonary vasculature is normal. Negative for osseous abnormalities. Tortuous aorta.  Minimal convex right curvature of the thoracic spine.  Impression: 1.  Negative for acute process involving the chest.    2.  Stable findings as noted above.    Electronically signed by: Ye Colvin MD  Date:    04/25/2022  Time:    12:21        Assessment/Plan:       Problem List Items Addressed This Visit        Psychiatric    ADHD (attention deficit hyperactivity disorder)     Controlled on vyvanse              Pulmonary    RESOLVED: Pleural effusion, not elsewhere classified - Primary     Resolved  Post PNA  pleurisy symptoms improving  Recommend taking naproxen for continued healing and pain relief               Endocrine    BMI 36.0-36.9,adult     Weight loss and exercise to improve overall health.                Other    EUGENIO (obstructive sleep apnea)     Started on CPAP 5/9/22  Will return to clinic in a few weeks for initial compliance visit               Follow up in about 2 months (around 7/24/2022) for EUGENIO follow up.    Discussed diagnosis, its evaluation, treatment and usual course. All questions answered.    Patient verbalized understanding of plan and left in no acute distress    Thank you for the courtesy of participating in the care of this patient    Rupali Guo PA-C

## 2022-07-11 ENCOUNTER — OFFICE VISIT (OUTPATIENT)
Dept: PULMONOLOGY | Facility: CLINIC | Age: 22
End: 2022-07-11
Payer: MEDICAID

## 2022-07-11 VITALS
DIASTOLIC BLOOD PRESSURE: 80 MMHG | WEIGHT: 263.56 LBS | RESPIRATION RATE: 18 BRPM | SYSTOLIC BLOOD PRESSURE: 120 MMHG | BODY MASS INDEX: 35.7 KG/M2 | HEART RATE: 106 BPM | HEIGHT: 72 IN | OXYGEN SATURATION: 97 %

## 2022-07-11 DIAGNOSIS — G47.33 OSA (OBSTRUCTIVE SLEEP APNEA): Primary | ICD-10-CM

## 2022-07-11 DIAGNOSIS — F90.8 ATTENTION DEFICIT HYPERACTIVITY DISORDER (ADHD), OTHER TYPE: ICD-10-CM

## 2022-07-11 PROCEDURE — 99999 PR PBB SHADOW E&M-EST. PATIENT-LVL III: ICD-10-PCS | Mod: PBBFAC,,, | Performed by: PHYSICIAN ASSISTANT

## 2022-07-11 PROCEDURE — 3079F PR MOST RECENT DIASTOLIC BLOOD PRESSURE 80-89 MM HG: ICD-10-PCS | Mod: CPTII,,, | Performed by: PHYSICIAN ASSISTANT

## 2022-07-11 PROCEDURE — 3008F PR BODY MASS INDEX (BMI) DOCUMENTED: ICD-10-PCS | Mod: CPTII,,, | Performed by: PHYSICIAN ASSISTANT

## 2022-07-11 PROCEDURE — 3079F DIAST BP 80-89 MM HG: CPT | Mod: CPTII,,, | Performed by: PHYSICIAN ASSISTANT

## 2022-07-11 PROCEDURE — 99214 PR OFFICE/OUTPT VISIT, EST, LEVL IV, 30-39 MIN: ICD-10-PCS | Mod: S$PBB,,, | Performed by: PHYSICIAN ASSISTANT

## 2022-07-11 PROCEDURE — 3074F SYST BP LT 130 MM HG: CPT | Mod: CPTII,,, | Performed by: PHYSICIAN ASSISTANT

## 2022-07-11 PROCEDURE — 3008F BODY MASS INDEX DOCD: CPT | Mod: CPTII,,, | Performed by: PHYSICIAN ASSISTANT

## 2022-07-11 PROCEDURE — 99214 OFFICE O/P EST MOD 30 MIN: CPT | Mod: S$PBB,,, | Performed by: PHYSICIAN ASSISTANT

## 2022-07-11 PROCEDURE — 1160F PR REVIEW ALL MEDS BY PRESCRIBER/CLIN PHARMACIST DOCUMENTED: ICD-10-PCS | Mod: CPTII,,, | Performed by: PHYSICIAN ASSISTANT

## 2022-07-11 PROCEDURE — 1160F RVW MEDS BY RX/DR IN RCRD: CPT | Mod: CPTII,,, | Performed by: PHYSICIAN ASSISTANT

## 2022-07-11 PROCEDURE — 99213 OFFICE O/P EST LOW 20 MIN: CPT | Mod: PBBFAC | Performed by: PHYSICIAN ASSISTANT

## 2022-07-11 PROCEDURE — 1159F MED LIST DOCD IN RCRD: CPT | Mod: CPTII,,, | Performed by: PHYSICIAN ASSISTANT

## 2022-07-11 PROCEDURE — 3074F PR MOST RECENT SYSTOLIC BLOOD PRESSURE < 130 MM HG: ICD-10-PCS | Mod: CPTII,,, | Performed by: PHYSICIAN ASSISTANT

## 2022-07-11 PROCEDURE — 1159F PR MEDICATION LIST DOCUMENTED IN MEDICAL RECORD: ICD-10-PCS | Mod: CPTII,,, | Performed by: PHYSICIAN ASSISTANT

## 2022-07-11 PROCEDURE — 99999 PR PBB SHADOW E&M-EST. PATIENT-LVL III: CPT | Mod: PBBFAC,,, | Performed by: PHYSICIAN ASSISTANT

## 2022-07-11 NOTE — PROGRESS NOTES
Subjective:       Patient ID: Maciel Miranda is a 22 y.o. male.    Chief Complaint: Sleep Apnea      7/11/22  EUGENIO on CPAP follow up  Having trouble with mask, using nasal pillow, says it does not stay on, feels like it is not secure on his face despite tightening straps  When the mask does stay on he says he benefits greatly, wants to keep using CPAP  Patient states improved symptoms with use of CPAP. Sleeping more soundly. Waking up feeling more refreshed. Improved daytime sleepiness.  AHI 0.4 with use    5/24/22  21yo male here for ER follow up  Went to [facility] ER 4/25 for pleural effusion  CTA negative for PE, positive for Small right pleural effusion with mild interstitial thickening ground-glass opacities within the right lower lobe which could reflect infectious or inflammatory pneumonitis.  Mild atelectasis also seen within the right middle lobe.  Given augmentin and azithrymycin  Also went to [facility] ER 5/11 for lingering pleurisy, cxr without effusion  He was given naproxen but has not taken  He says he feels like he is gradually getting better, denies any worsening pain  No SOB or fever  He is established with [facility] - Had thoracentesis for pleural effusion/PNA 2/2022, took levaquin  Non smoker, no alcohol, no illicit drugs  No personal or family history of cancer  Also recently started on CPAP 5/9/22, will return in a few weeks for initial compliance visit    Immunization History   Administered Date(s) Administered    HPV 9-Valent 08/26/2015    HPV Quadrivalent 07/09/2012, 06/17/2014    Meningococcal Conjugate (MCV4P) 08/26/2015    PPD Test 05/28/2013, 06/17/2014, 08/26/2015, 12/14/2016, 01/17/2017      Tobacco Use: Medium Risk    Smoking Tobacco Use: Former Smoker    Smokeless Tobacco Use: Former User      Past Medical History:   Diagnosis Date    ADHD (attention deficit hyperactivity disorder)     primarily inattentive    Sleep apnea       Current Outpatient Medications on File  Prior to Visit   Medication Sig Dispense Refill    naproxen (NAPROSYN) 500 MG tablet Take 1 tablet (500 mg total) by mouth 2 (two) times daily as needed (pain). 20 tablet 0    VYVANSE 30 mg capsule Take 30 mg by mouth every morning.      amoxicillin-clavulanate 875-125mg (AUGMENTIN) 875-125 mg per tablet Take 1 tablet by mouth 2 (two) times daily. (Patient not taking: Reported on 7/11/2022) 14 tablet 0    azithromycin (Z-CHARMAINE) 250 MG tablet Take 1 tablet (250 mg total) by mouth once daily. Take first 2 tablets together, then 1 every day until finished. (Patient not taking: Reported on 7/11/2022) 6 tablet 0    tretinoin (RETIN-A) 0.025 % gel Apply topically nightly. 45 g 3     No current facility-administered medications on file prior to visit.        Review of Systems   Constitutional: Negative for fever, weight loss, appetite change, fatigue and weakness.   HENT: Negative for postnasal drip, rhinorrhea, sinus pressure, trouble swallowing and congestion.    Respiratory: Negative for cough, sputum production, choking, chest tightness, shortness of breath, wheezing and dyspnea on extertion.    Cardiovascular: Negative for chest pain and leg swelling.   Musculoskeletal: Negative for arthralgias, gait problem and joint swelling.   Gastrointestinal: Negative for nausea, vomiting and abdominal pain.   Neurological: Negative for dizziness, weakness and headaches.   All other systems reviewed and are negative.      Objective:       Vitals:    07/11/22 1306   BP: 120/80   Pulse: 106   Resp: 18   SpO2: 97%   Weight: 119.6 kg (263 lb 9 oz)   Height: 6' (1.829 m)       Physical Exam   Constitutional: He is oriented to person, place, and time. He appears well-developed and well-nourished. No distress.   HENT:   Head: Normocephalic.   Nose: Nose normal.   Mouth/Throat: Oropharynx is clear and moist.   Cardiovascular: Normal rate and regular rhythm.   Pulmonary/Chest: Effort normal. No respiratory distress. He has no wheezes.  He has no rhonchi. He has no rales.   Musculoskeletal:         General: No edema.      Cervical back: Normal range of motion and neck supple.   Lymphadenopathy: No supraclavicular adenopathy is present.     He has no cervical adenopathy.   Neurological: He is alert and oriented to person, place, and time. Gait normal.   Skin: Skin is warm and dry.   Psychiatric: He has a normal mood and affect.   Vitals reviewed.    Personal Diagnostic Review  Compliance Report  Compliance  Payor Standard  Usage 06/11/2022 - 07/10/2022  Usage days 26/30 days (87%)  >= 4 hours 9 days (30%)  < 4 hours 17 days (57%)  Usage hours 82 hours 38 minutes  Average usage (total days) 2 hours 45 minutes  Average usage (days used) 3 hours 11 minutes  Median usage (days used) 2 hours 48 minutes  Total used hours (value since last reset - 07/10/2022) 210 hours  AirSense 11 AutoSet  Serial number [id number]  Mode AutoSet  Min Pressure 4 cmH2O  Max Pressure 20 cmH2O  EPR Fulltime  EPR level 3  Response Standard  Therapy  Pressure - cmH2O Median: 4.4 95th percentile: 6.2 Maximum: 7.7  Leaks - L/min Median: 0.0 95th percentile: 2.2 Maximum: 23.2  Events per hour AI: 0.3 HI: 0.1 AHI: 0.4  Apnea Index Central: 0.1 Obstructive: 0.1 Unknown: 0.1  RERA Index 0.0  Cheyne-Darnell respiration (average duration per night) 0 minutes (0%)    CTA Chest Non-Coronary (PE Study)  Narrative: EXAMINATION:  CTA CHEST NON CORONARY    CLINICAL HISTORY:  Chest pain and shortness of breath    TECHNIQUE:  After the intravenous administration of 100 cc of Omni 350 nonionic contrast using CT pulmonary angio technique, 1.25  Mm axial images were acquired using helical CT technique from the lung apices through costophrenic sulci.  Sagittal coronal and oblique MIPS were also submitted for interpretation.    COMPARISON:  Chest x-ray dated 04/25/2022    FINDINGS:  -Pulmonary arteries: Pulmonary arteries are suboptimally opacified.  No evidence of pulmonary embolism.  No evidence  of pulmonary hypertension.  No right heart strain is identified with RV/LV ratio < 0.9.    -Lungs/pleura: No nodules.  Small right pleural effusion with mild interstitial thickening ground-glass opacities within the right lower lobe which could reflect infectious or inflammatory pneumonitis.  Mild atelectasis also seen within the right middle lobe.    -Mediastinum/Chelsea:Shotty adenopathy    -Axilla: No adenopathy.    -Thyroid: Normal lower gland.    -Heart/Aorta: Heart size is normal.  Moderate  coronary artery disease.  No pericardial effusion. Aorta normal caliber.   Aorta demonstrates moderate atherosclerotic disease.    -Bones/Chest Wall: Intact    -Upper Abdomen: Unremarkable with mild constipation.  Mild hepatomegaly.  Small hiatal hernia.  Impression: No evidence of central or segmental pulmonary embolism.  Limited evaluation.    Small right pleural effusion with mild interstitial thickening ground-glass opacities within the right lower lobe which could reflect infectious or inflammatory pneumonitis.    See additional findings above    All CT scans at this facility use dose modulation, iterative reconstruction and/or weight based dosing when appropriate to reduce radiation dose to as low as reasonably achievable.    Electronically signed by: Mike Bernstein MD  Date:    04/25/2022  Time:    14:02  X-Ray Chest AP Portable  Narrative: EXAMINATION:  XR CHEST AP PORTABLE    CLINICAL HISTORY:  Chest pain;    COMPARISON:  March 31, 2022    FINDINGS:  EKG leads overlie the chest.  Stable minimal scarring or atelectasis in the right lung base.  The lungs are free of new pulmonary opacities.  The cardiac silhouette size is normal. The trachea is midline and the mediastinal width is normal. Negative for focal infiltrate, effusion or pneumothorax. Pulmonary vasculature is normal. Negative for osseous abnormalities. Tortuous aorta.  Minimal convex right curvature of the thoracic spine.  Impression: 1.  Negative for acute  process involving the chest.    2.  Stable findings as noted above.    Electronically signed by: Ye Colvin MD  Date:    04/25/2022  Time:    12:21        Assessment/Plan:       Problem List Items Addressed This Visit        Psychiatric    ADHD (attention deficit hyperactivity disorder)     Controlled on vyvanse              Endocrine    BMI 36.0-36.9,adult     Weight loss and exercise to improve overall health.                Other    EUGENIO (obstructive sleep apnea) - Primary     Benefits from use  Discussed therapeutic goals for CPAP: Ideal usage 100% of nights for 6-8 hours per night. Minimum usage is 70% of night for at least 4 hours per night.  Compliance low due to mask issues, definitely wants to keep using  I recommend trying FFM, he says he wants to use nasal mask but maybe different kind of head straps?  Will have him see Ami TREVIZO today to determine best mask fit               Follow up in about 3 months (around 10/11/2022) for EUGENIO follow up.    Discussed diagnosis, its evaluation, treatment and usual course. All questions answered.    Patient verbalized understanding of plan and left in no acute distress    Thank you for the courtesy of participating in the care of this patient    Rupali Guo PA-C

## 2022-07-11 NOTE — ASSESSMENT & PLAN NOTE
Benefits from use  Discussed therapeutic goals for CPAP: Ideal usage 100% of nights for 6-8 hours per night. Minimum usage is 70% of night for at least 4 hours per night.  Compliance low due to mask issues, definitely wants to keep using  I recommend trying FFM, he says he wants to use nasal mask but maybe different kind of head straps?  Will have him see Ami TREVIZO today to determine best mask fit

## 2022-09-06 ENCOUNTER — PATIENT MESSAGE (OUTPATIENT)
Dept: PULMONOLOGY | Facility: CLINIC | Age: 22
End: 2022-09-06
Payer: MEDICAID

## 2022-10-05 ENCOUNTER — OFFICE VISIT (OUTPATIENT)
Dept: PULMONOLOGY | Facility: CLINIC | Age: 22
End: 2022-10-05
Payer: MEDICAID

## 2022-10-05 ENCOUNTER — HOSPITAL ENCOUNTER (OUTPATIENT)
Dept: RADIOLOGY | Facility: HOSPITAL | Age: 22
Discharge: HOME | End: 2022-10-05
Attending: NURSE PRACTITIONER
Payer: MEDICAID

## 2022-10-05 VITALS
DIASTOLIC BLOOD PRESSURE: 60 MMHG | SYSTOLIC BLOOD PRESSURE: 120 MMHG | BODY MASS INDEX: 35.78 KG/M2 | WEIGHT: 264.13 LBS | HEIGHT: 72 IN | RESPIRATION RATE: 18 BRPM | HEART RATE: 60 BPM | OXYGEN SATURATION: 99 %

## 2022-10-05 DIAGNOSIS — F90.8 ATTENTION DEFICIT HYPERACTIVITY DISORDER (ADHD), OTHER TYPE: ICD-10-CM

## 2022-10-05 DIAGNOSIS — E66.01 CLASS 2 SEVERE OBESITY WITH SERIOUS COMORBIDITY AND BODY MASS INDEX (BMI) OF 35.0 TO 35.9 IN ADULT, UNSPECIFIED OBESITY TYPE: ICD-10-CM

## 2022-10-05 DIAGNOSIS — J90 PLEURAL EFFUSION, NOT ELSEWHERE CLASSIFIED: ICD-10-CM

## 2022-10-05 DIAGNOSIS — G47.33 OSA ON CPAP: Primary | ICD-10-CM

## 2022-10-05 PROCEDURE — 99999 PR PBB SHADOW E&M-EST. PATIENT-LVL III: CPT | Mod: PBBFAC,,, | Performed by: NURSE PRACTITIONER

## 2022-10-05 PROCEDURE — 99215 PR OFFICE/OUTPT VISIT, EST, LEVL V, 40-54 MIN: ICD-10-PCS | Mod: S$PBB,,, | Performed by: NURSE PRACTITIONER

## 2022-10-05 PROCEDURE — 3078F DIAST BP <80 MM HG: CPT | Mod: CPTII,,, | Performed by: NURSE PRACTITIONER

## 2022-10-05 PROCEDURE — 99215 OFFICE O/P EST HI 40 MIN: CPT | Mod: S$PBB,,, | Performed by: NURSE PRACTITIONER

## 2022-10-05 PROCEDURE — 71046 XR CHEST PA AND LATERAL: ICD-10-PCS | Mod: 26,,, | Performed by: RADIOLOGY

## 2022-10-05 PROCEDURE — 1159F MED LIST DOCD IN RCRD: CPT | Mod: CPTII,,, | Performed by: NURSE PRACTITIONER

## 2022-10-05 PROCEDURE — 71046 X-RAY EXAM CHEST 2 VIEWS: CPT | Mod: 26,,, | Performed by: RADIOLOGY

## 2022-10-05 PROCEDURE — 3008F PR BODY MASS INDEX (BMI) DOCUMENTED: ICD-10-PCS | Mod: CPTII,,, | Performed by: NURSE PRACTITIONER

## 2022-10-05 PROCEDURE — 99213 OFFICE O/P EST LOW 20 MIN: CPT | Mod: PBBFAC,25 | Performed by: NURSE PRACTITIONER

## 2022-10-05 PROCEDURE — 99417 PROLNG OP E/M EACH 15 MIN: CPT | Mod: S$PBB,,, | Performed by: NURSE PRACTITIONER

## 2022-10-05 PROCEDURE — 3074F PR MOST RECENT SYSTOLIC BLOOD PRESSURE < 130 MM HG: ICD-10-PCS | Mod: CPTII,,, | Performed by: NURSE PRACTITIONER

## 2022-10-05 PROCEDURE — 3078F PR MOST RECENT DIASTOLIC BLOOD PRESSURE < 80 MM HG: ICD-10-PCS | Mod: CPTII,,, | Performed by: NURSE PRACTITIONER

## 2022-10-05 PROCEDURE — 1160F RVW MEDS BY RX/DR IN RCRD: CPT | Mod: CPTII,,, | Performed by: NURSE PRACTITIONER

## 2022-10-05 PROCEDURE — 99999 PR PBB SHADOW E&M-EST. PATIENT-LVL III: ICD-10-PCS | Mod: PBBFAC,,, | Performed by: NURSE PRACTITIONER

## 2022-10-05 PROCEDURE — 1159F PR MEDICATION LIST DOCUMENTED IN MEDICAL RECORD: ICD-10-PCS | Mod: CPTII,,, | Performed by: NURSE PRACTITIONER

## 2022-10-05 PROCEDURE — 99417 PR PROLONGED SVC, OUTPT, W/WO DIRECT PT CONTACT,  EA ADDTL 15 MIN: ICD-10-PCS | Mod: S$PBB,,, | Performed by: NURSE PRACTITIONER

## 2022-10-05 PROCEDURE — 1160F PR REVIEW ALL MEDS BY PRESCRIBER/CLIN PHARMACIST DOCUMENTED: ICD-10-PCS | Mod: CPTII,,, | Performed by: NURSE PRACTITIONER

## 2022-10-05 PROCEDURE — 71046 X-RAY EXAM CHEST 2 VIEWS: CPT | Mod: TC

## 2022-10-05 PROCEDURE — 3008F BODY MASS INDEX DOCD: CPT | Mod: CPTII,,, | Performed by: NURSE PRACTITIONER

## 2022-10-05 PROCEDURE — 3074F SYST BP LT 130 MM HG: CPT | Mod: CPTII,,, | Performed by: NURSE PRACTITIONER

## 2022-10-05 RX ORDER — LEUCOVORIN, FOLIC ACID, LEVOMEFOLATE MAGNESIUM, FERROUS CYSTEINE GLYCINATE, 1,2-DOCOSAHEXANOYL-SN-GLYCERO-3-PHOSPHOSERINE CALCIUM, 1,2-ICOSAPENTOYL-SN-GLYCERO-3-PHOSPHOSERINE CALCIUM, PHOSPHATIDYL SERINE, PYRIDOXAL 5-PHOSPHATE, FLAVIN ADENINE DINUCLEOTIDE, NADH, COBAMAMIDE, COCARBOXYLASE (THIAMINE PYROPHOSPHATE), MAGNESIUM ASCORBATE, ZINC ASCORBATE, MAGNESIUM L-THREONATE AND BETAINE 2.5; 1; 7; 13.6; 6.4; 800; 12; 25; 25; 25; 50; 25; 24; 1; 1; 500; 1.83; 3.67 MG/1; MG/1; MG/1; MG/1; MG/1; UG/1; MG/1; UG/1; UG/1; UG/1; UG/1; UG/1; MG/1; MG/1; MG/1; UG/1; MG/1; MG/1
1 CAPSULE, DELAYED RELEASE PELLETS ORAL DAILY
COMMUNITY
Start: 2022-10-02

## 2022-10-05 RX ORDER — ESCITALOPRAM OXALATE 10 MG/1
TABLET, FILM COATED ORAL
COMMUNITY
Start: 2022-09-30

## 2022-10-05 NOTE — PROGRESS NOTES
Subjective:      Patient ID: Maciel Miranda is a 22 y.o. male.    Chief Complaint: No chief complaint on file.    HPI: Maciel Miranda presents to clinic for follow up for EUGENIO with CPAP complaince assessment.  He is new to me, has been seen prior by GEORGINA ROOT.  He is on Auto CPAP of 4-20 cmH2O pressure which is optimally controlling sleep apnea with apneic index (AHI) 1.0 events an hour.   Complaince download today reveals 13% of days with greater than 4 hours of device use.   Patient states he is now getting bills for CPAP since was not compliant with use and insurance medicaid [employer] is no longer paying for rental on machine or supplies.  He has complaint of does not like nasal pillows mask since strap falls off his head. He was told since not compliant will have to cover cost of machine. He states has not used in past month since thought if used would be billed for it.   Since off CPAP he feels tired and would like to continue CPAP with trial of Full face mask.  He states he was planning on turning in machine today, since can not afford $800 bill keep machine.   After long discuss with patient of his desire to continue CPAP, spoke with makenzie via team messaging to determine if patient could try to have extension with [employer].     Team messaging from [facility]   [3:09 PM] Makenzie Dickson  the insurance will not give an auth for a new mask. I would try giving them a donated or sample mask and give them 30 days to see if they can reach compliance    [3:09 PM] Makenzie Dickson  after that if they didnt reach compliance they need to turn it in. Marko Krause will need a compliance download every 3 months to renew the auth to continue the rental so the patient needs to know that they need to remain compliant throughout    [3:10 PM] Makenzie Dickson  even for supplies if they are not 70% [employer] will not pay for the supplies    Determined no donated or sample Full face mask  available in  region. Before Makenzie could check other locations for Full face mask donated, patient decided he did not want to keep machine and prove complaint every 30 days and he turned in CPAP machine to [facility] today JARED.     After continued discussed [facility], provided patient [facility].       3/21/2022, 3/23/2022 Home Sleep Study Mild sleep disordered breathing (AHI=11).      Compliance  Payor Standard  Usage 08/12/2022 - 09/25/2022  Usage days 30/45 days (67%)  >= 4 hours 6 days (13%)  < 4 hours 24 days (53%)  Usage hours 68 hours 2 minutes  Average usage (total days) 1 hours 31 minutes  Average usage (days used) 2 hours 16 minutes  Median usage (days used) 1 hours 38 minutes  Total used hours (value since last reset - 09/25/2022) 334 hours  AirSense 11 AutoSet  Serial number [id number]  Mode AutoSet  Min Pressure 4 cmH2O  Max Pressure 20 cmH2O  EPR Fulltime  EPR level 3  Response Standard  Therapy  Pressure - cmH2O Median: 4.1 95th percentile: 5.6 Maximum: 8.0  Leaks - L/min Median: 0.0 95th percentile: 2.7 Maximum: 31.6  Events per hour AI: 0.9 HI: 0.1 AHI: 1.0  Apnea Index Central: 0.1 Obstructive: 0.5 Unknown: 0.3  RERA Index 0.0      Renner Sleepiness Scale   EPWORTH SLEEPINESS SCALE 10/5/2022 7/11/2022 5/24/2022 3/10/2022   Sitting and reading 1 0 2 0   Watching TV 0 2 0 1   Sitting, inactive in a public place (e.g. a theatre or a meeting) 0 0 0 0   As a passenger in a car for an hour without a break 2 0 2 2   Lying down to rest in the afternoon when circumstances permit 2 3 0 3   Sitting and talking to someone 0 0 0 0   Sitting quietly after a lunch without alcohol 0 1 0 2   In a car, while stopped for a few minutes in traffic 0 0 0 0   Total score 5 6 4 8       Patient has history pleural effusion in past, he states he is stable from pulmonary stand point and does not feel problem. He is willing for repeat chest xray today.      10/5/2022 chest xray lungs are clear.     Previous Report Reviewed: lab reports and office notes     Past Medical History: The following portions of the patient's history were reviewed and updated as appropriate:   He  has no past surgical history on file.  His family history includes Cancer in his paternal grandfather.  He  reports that he has quit smoking. He has quit using smokeless tobacco. He reports that he does not drink alcohol and does not use drugs.  He has a current medication list which includes the following prescription(s): azithromycin, enlyte, lexapro, naproxen, and vyvanse.  He has No Known Allergies.     The following portions of the patient's history were reviewed and updated as appropriate: allergies, current medications, past family history, past medical history, past social history, past surgical history and problem list.    Review of Systems   Constitutional:  Negative for fever, chills, weight loss, weight gain, activity change, appetite change, fatigue and night sweats.   HENT:  Negative for postnasal drip, rhinorrhea, sinus pressure, voice change and congestion.    Eyes:  Negative for redness and itching.   Respiratory:  Negative for snoring, cough, sputum production, chest tightness, shortness of breath, wheezing, orthopnea, asthma nighttime symptoms, dyspnea on extertion, use of rescue inhaler and somnolence.    Cardiovascular: Negative.  Negative for chest pain, palpitations and leg swelling.   Genitourinary:  Negative for difficulty urinating and hematuria.   Endocrine:  Negative for cold intolerance and heat intolerance.    Musculoskeletal:  Negative for arthralgias, gait problem, joint swelling and myalgias.   Skin: Negative.    Gastrointestinal:  Negative for nausea, vomiting, abdominal pain and acid reflux.   Neurological:  Negative for dizziness, weakness, light-headedness and headaches.   Hematological:  Negative for adenopathy. No excessive bruising.   All other systems  reviewed and are negative.   Objective:   /60   Pulse 60   Resp 18   Ht 6' (1.829 m)   Wt 119.8 kg (264 lb 1.8 oz)   SpO2 99%   BMI 35.82 kg/m²   Physical Exam  Vitals and nursing note reviewed.   Constitutional:       General: He is not in acute distress.     Appearance: He is well-developed. He is not ill-appearing or toxic-appearing.   HENT:      Head: Normocephalic and atraumatic.      Right Ear: External ear normal.      Left Ear: External ear normal.      Nose: Nose normal.      Mouth/Throat:      Pharynx: No oropharyngeal exudate.   Eyes:      Conjunctiva/sclera: Conjunctivae normal.   Cardiovascular:      Rate and Rhythm: Normal rate and regular rhythm.      Heart sounds: Normal heart sounds.   Pulmonary:      Effort: Pulmonary effort is normal.      Breath sounds: Normal breath sounds.   Abdominal:      Palpations: Abdomen is soft.   Musculoskeletal:      Cervical back: Normal range of motion and neck supple.   Skin:     General: Skin is warm and dry.   Neurological:      Mental Status: He is alert and oriented to person, place, and time.   Psychiatric:         Behavior: Behavior normal. Behavior is cooperative.         Thought Content: Thought content normal.         Judgment: Judgment normal.       Personal Diagnostic Review  CPAP download      X-Ray Chest PA And Lateral  Narrative: EXAMINATION:  XR CHEST PA AND LATERAL    CLINICAL HISTORY:  Pleural effusion, not elsewhere classified    TECHNIQUE:  PA and lateral views of the chest were performed.    COMPARISON:  4/25/22    FINDINGS:  The lungs are clear, with normal appearance of pulmonary vasculature and no pleural effusion or pneumothorax.    The cardiac silhouette is normal in size. The hilar and mediastinal contours are unremarkable.    Bones are intact.  Impression: No acute abnormality.    Electronically signed by: Mike Bernal  Date:    10/05/2022  Time:    17:58      Assessment:     1. EUGENIO on CPAP    2. Class 2 severe obesity with  serious comorbidity and body mass index (BMI) of 35.0 to 35.9 in adult, unspecified obesity type    3. Attention deficit hyperactivity disorder (ADHD), other type    4. Pleural effusion, not elsewhere classified        Orders Placed This Encounter   Procedures    CPAP/BIPAP SUPPLIES     Benefits and compliant  90 day supply. 4 refills  HME: [facility]  Has nasal pillows mask he would like to try Full face mask, he understands will need appointment for mask fitting. He will call.     Order Specific Question:   Length of need (1-99 months):     Answer:   99     Order Specific Question:   Choose ONE mask type and its corresponding cushions and/or pillows:     Answer:    Full Face Mask, 1 per 90 days:  Full Face Cushion, (3 per 90 days)     Order Specific Question:   Choose EITHER Heated or Non-Heated Tubjing     Answer:    Non-Heated Tubing, 1 per 90 days     Order Specific Question:   Number of Days Needed:     Answer:   99     Order Specific Question:   All other supplies as needed as listed below:     Answer:    Headgear, 1 per 180 days     Order Specific Question:   All other supplies as needed as listed below:     Answer:    Chin Strap, 1 per 180 days     Order Specific Question:   All other supplies as needed as listed below:     Answer:    Disposable Filter, 6 per 90 days     Order Specific Question:   All other supplies as needed as listed below:     Answer:    Humidifier Chamber, 1 per 180 days     Order Specific Question:   All other supplies as needed as listed below:     Answer:    Non-Disposable Filter, 1 per 180 days    X-Ray Chest PA And Lateral     Standing Status:   Future     Number of Occurrences:   1     Standing Expiration Date:   10/5/2023     Order Specific Question:   May the Radiologist modify the order per protocol to meet the clinical needs of the patient?     Answer:   Yes     Order Specific Question:   Release to patient     Answer:   Immediate        Plan:     Problem List Items Addressed This Visit       RESOLVED: Pleural effusion, not elsewhere classified     10/5/2022 chest xray lungs clear           Relevant Orders    X-Ray Chest PA And Lateral    EUGENIO on CPAP - Primary     3/21/2022, 3/23/2022 Home Sleep Study Mild sleep disordered breathing (AHI=11).  On Auto CPAP 4-20 cm   Not compliant, turned in CPAP machine to [facility] today A[facility], see HPI for details.   Copy of Home Sleep Study report [facility], since [facility].   Follow up with [facility] if would like to start over and resume CPAP use with his [employer] insurance.          Relevant Orders    CPAP/BIPAP SUPPLIES    Class 2 severe obesity with serious comorbidity and body mass index (BMI) of 35.0 to 35.9 in adult     Encouraged calorie reduction and 30 minutes of exercise daily. Discussed impact of obesity on general health.  Wt Readings from Last 9 Encounters:   10/05/22 119.8 kg (264 lb 1.8 oz)   07/11/22 119.6 kg (263 lb 9 oz)   05/24/22 120.1 kg (264 lb 12.4 oz)   05/05/22 120.1 kg (264 lb 12.4 oz)   04/25/22 125.5 kg (276 lb 10.8 oz)   03/31/22 124 kg (273 lb 5.9 oz)   03/10/22 122 kg (268 lb 15.4 oz)   02/11/22 122.7 kg (270 lb 8.1 oz)   12/14/16 76.6 kg (168 lb 15.7 oz)   Body mass index is 35.82 kg/m².             ADHD (attention deficit hyperactivity disorder)     Controlled on vyvanse           I spent a total of 91 minutes on the day of the visit.  This includes face to face time and non-face to face time preparing to see the patient (eg, review of tests), obtaining and/or reviewing separately obtained history, documenting clinical information in the electronic or other health record, independently interpreting results and communicating results to the patient and working with [facility] to aid patient in continuing CPAP with current machine and obtain needed supplies.     Follow up if decides would like to resume cpap  use.

## 2022-10-05 NOTE — ASSESSMENT & PLAN NOTE
Encouraged calorie reduction and 30 minutes of exercise daily. Discussed impact of obesity on general health.  Wt Readings from Last 9 Encounters:   10/05/22 119.8 kg (264 lb 1.8 oz)   07/11/22 119.6 kg (263 lb 9 oz)   05/24/22 120.1 kg (264 lb 12.4 oz)   05/05/22 120.1 kg (264 lb 12.4 oz)   04/25/22 125.5 kg (276 lb 10.8 oz)   03/31/22 124 kg (273 lb 5.9 oz)   03/10/22 122 kg (268 lb 15.4 oz)   02/11/22 122.7 kg (270 lb 8.1 oz)   12/14/16 76.6 kg (168 lb 15.7 oz)   Body mass index is 35.82 kg/m².

## 2022-10-05 NOTE — ASSESSMENT & PLAN NOTE
3/21/2022, 3/23/2022 Home Sleep Study Mild sleep disordered breathing (AHI=11).  On Auto CPAP 4-20 cm   Not compliant, turned in CPAP machine to [facility] AMA, see HPI for details.   Copy of Home Sleep Study report provided [facility], since [facility].   Follow up with [facility] if would like to start over and resume CPAP use with his [employer] insurance.

## 2022-11-28 ENCOUNTER — NURSE TRIAGE (OUTPATIENT)
Dept: ADMINISTRATIVE | Facility: CLINIC | Age: 22
End: 2022-11-28
Payer: MEDICAID

## 2022-11-28 NOTE — TELEPHONE ENCOUNTER
Patient c/o sinus pain with eye pain. He also has a cough.  Reason for Disposition   Has sinus pain or pressure   [1] Redness or swelling on the cheek, forehead or around the eye AND [2] no fever    Additional Information   Negative: Followed an eye injury   Negative: Eye pain from chemical in the eye   Negative: Eye pain from foreign body in eye   Negative: [1] Tender, red lump or pimple AND [2] located along the eyelid margin   Negative: SEVERE difficulty breathing (e.g., struggling for each breath, speaks in single words)   Negative: Sounds like a life-threatening emergency to the triager   Negative: [1] Sinus infection AND [2] taking an antibiotic AND [3] symptoms continue   Negative: [1] Difficulty breathing AND [2] not from stuffy nose (e.g., not relieved by cleaning out the nose)   Negative: [1] SEVERE headache AND [2] fever   Negative: [1] Redness or swelling on the cheek, forehead or around the eye AND [2] fever   Negative: Fever > 104 F (40 C)   Negative: Patient sounds very sick or weak to the triager   Negative: [1] SEVERE pain AND [2] not improved 2 hours after pain medicine    Protocols used: Eye Pain-A-AH, Sinus Pain or Congestion-A-AH

## 2023-04-03 ENCOUNTER — OFFICE VISIT (OUTPATIENT)
Dept: PULMONOLOGY | Facility: CLINIC | Age: 23
End: 2023-04-03
Payer: MEDICAID

## 2023-04-03 VITALS
HEIGHT: 72 IN | HEART RATE: 96 BPM | BODY MASS INDEX: 36.95 KG/M2 | DIASTOLIC BLOOD PRESSURE: 78 MMHG | RESPIRATION RATE: 20 BRPM | SYSTOLIC BLOOD PRESSURE: 126 MMHG | WEIGHT: 272.81 LBS | OXYGEN SATURATION: 97 %

## 2023-04-03 DIAGNOSIS — E66.01 CLASS 2 SEVERE OBESITY WITH SERIOUS COMORBIDITY AND BODY MASS INDEX (BMI) OF 37.0 TO 37.9 IN ADULT, UNSPECIFIED OBESITY TYPE: ICD-10-CM

## 2023-04-03 DIAGNOSIS — G47.33 OSA ON CPAP: ICD-10-CM

## 2023-04-03 DIAGNOSIS — F90.8 ATTENTION DEFICIT HYPERACTIVITY DISORDER (ADHD), OTHER TYPE: ICD-10-CM

## 2023-04-03 DIAGNOSIS — G47.33 OBSTRUCTIVE SLEEP APNEA: ICD-10-CM

## 2023-04-03 PROBLEM — G47.30 SLEEP-DISORDERED BREATHING: Status: ACTIVE | Noted: 2022-05-24

## 2023-04-03 PROCEDURE — 1159F MED LIST DOCD IN RCRD: CPT | Mod: CPTII,,, | Performed by: NURSE PRACTITIONER

## 2023-04-03 PROCEDURE — 3078F DIAST BP <80 MM HG: CPT | Mod: CPTII,,, | Performed by: NURSE PRACTITIONER

## 2023-04-03 PROCEDURE — 3008F BODY MASS INDEX DOCD: CPT | Mod: CPTII,,, | Performed by: NURSE PRACTITIONER

## 2023-04-03 PROCEDURE — 1160F RVW MEDS BY RX/DR IN RCRD: CPT | Mod: CPTII,,, | Performed by: NURSE PRACTITIONER

## 2023-04-03 PROCEDURE — 3074F PR MOST RECENT SYSTOLIC BLOOD PRESSURE < 130 MM HG: ICD-10-PCS | Mod: CPTII,,, | Performed by: NURSE PRACTITIONER

## 2023-04-03 PROCEDURE — 99999 PR PBB SHADOW E&M-EST. PATIENT-LVL III: CPT | Mod: PBBFAC,,, | Performed by: NURSE PRACTITIONER

## 2023-04-03 PROCEDURE — 1160F PR REVIEW ALL MEDS BY PRESCRIBER/CLIN PHARMACIST DOCUMENTED: ICD-10-PCS | Mod: CPTII,,, | Performed by: NURSE PRACTITIONER

## 2023-04-03 PROCEDURE — 99213 OFFICE O/P EST LOW 20 MIN: CPT | Mod: PBBFAC | Performed by: NURSE PRACTITIONER

## 2023-04-03 PROCEDURE — 3078F PR MOST RECENT DIASTOLIC BLOOD PRESSURE < 80 MM HG: ICD-10-PCS | Mod: CPTII,,, | Performed by: NURSE PRACTITIONER

## 2023-04-03 PROCEDURE — 99214 OFFICE O/P EST MOD 30 MIN: CPT | Mod: S$PBB,,, | Performed by: NURSE PRACTITIONER

## 2023-04-03 PROCEDURE — 3074F SYST BP LT 130 MM HG: CPT | Mod: CPTII,,, | Performed by: NURSE PRACTITIONER

## 2023-04-03 PROCEDURE — 99214 PR OFFICE/OUTPT VISIT, EST, LEVL IV, 30-39 MIN: ICD-10-PCS | Mod: S$PBB,,, | Performed by: NURSE PRACTITIONER

## 2023-04-03 PROCEDURE — 1159F PR MEDICATION LIST DOCUMENTED IN MEDICAL RECORD: ICD-10-PCS | Mod: CPTII,,, | Performed by: NURSE PRACTITIONER

## 2023-04-03 PROCEDURE — 3008F PR BODY MASS INDEX (BMI) DOCUMENTED: ICD-10-PCS | Mod: CPTII,,, | Performed by: NURSE PRACTITIONER

## 2023-04-03 PROCEDURE — 99999 PR PBB SHADOW E&M-EST. PATIENT-LVL III: ICD-10-PCS | Mod: PBBFAC,,, | Performed by: NURSE PRACTITIONER

## 2023-04-03 NOTE — Clinical Note
Mohsen Garcia, this patient has been approved proceed with CPAP without effusion study.  You will be able to see authorization number on [employer] portal 4/14/2023 by about 1pm or so.   Sheyla, please arrange follow up for IDL CPAP Thank you

## 2023-04-03 NOTE — PROGRESS NOTES
Subjective:      Patient ID: Maciel Miranda is a 23 y.o. male.    Chief Complaint: Sleep Apnea      HPI: Maciel Miranda presents to clinic to re-establish care for obstructive sleep apnea.    Prior diagnosis obstructive sleep apnea   3/21/2022, 3/23/2022 Home Sleep Study Mild sleep disordered breathing (AHI=11).  Setup Date: 05/09/2022  [facility] AutoPAP  [facility]  10/5/2022 patient turned in CPAP machine AMA related to non compliance/difficulty with mask.  [facility], he may bring copy of report to obtain [facility]AP machine with [facility].   He was unable to access [facility] for [facility].   4/3/2023 patient presents re-evaluation symptomatic obstructive sleep apnea, witnessed apnea, restless sleep, daytime sleepiness, awakens feeling tired.   4/3/2023 Proceed with 1 night Home Sleep Study   4/2023 repeat Home Sleep Study denied by [employer].   4/14/2023 Addendum: 4/14/2023 Peer review with Dr. Meneses with [facility]/[employer] insurance approved obtaining CPAP machine to treat obstructive sleep apnea without repeating sleep study. No weight loss, no oropharyngeal surgery, patient remains symptoms daytime sleepiness, feeling tired, restless sleep, witnessed apnea.    Sleep Apnea  Patient has been observed snoring with restless sleep, witnessed apnea. Daytime sleepiness, feeling tired.   Patient reports non restful' sleep.  He denies morning headache.   He reports day time napping.  Day time napping duration 30 Minutes  He denies recent weight gain.  Cardiovascular risk factors: obesity  Bed time is 1000  Wake time is 0600  Sleep onset is within  1 Hour.  Sleep maintenance difficulties related to non-restful sleep  Wake after sleep onset occurs one two times a night.  Nocturia occurs none   Sleep aids : No  Dry mouth : No  Sleep walking: No  Sleep talking : No  Sleep eating:No  Vivid Dreams : No  Cataplexy : No    [place] Sleepiness Scale   EPWORTH SLEEPINESS SCALE 4/3/2023 10/5/2022 7/11/2022 5/24/2022 3/10/2022    Sitting and reading 2 1 0 2 0   Watching TV 2 0 2 0 1   Sitting, inactive in a public place (e.g. a theatre or a meeting) 1 0 0 0 0   As a passenger in a car for an hour without a break 2 2 0 2 2   Lying down to rest in the afternoon when circumstances permit 1 2 3 0 3   Sitting and talking to someone 1 0 0 0 0   Sitting quietly after a lunch without alcohol 1 0 1 0 2   In a car, while stopped for a few minutes in traffic 0 0 0 0 0   Total score 10 5 6 4 8       Neck circumference is 18.  Mallampati score 4    STOP - BANG Questionnaire:   1. Snoring : Do you snore loudly ?     YES  2. Tired : Do you often feel tired, fatigued, or sleepy during daytime?   YES  3. Observed: Has anyone observed you stop breathing during your sleep?     YES  4. Blood pressure : Do you have or are you being treated for high blood pressure?   NO  5. BMI :BMI more than 35 kg/m2?    YES  6. Age : Age over 50 yr old?   NO  7. Neck circumference: Neck circumference greater than 40 cm?    YES  8. Gender: Gender male?    YES    SCORE: 6    High risk of EUGENIO: Yes 5 - 8  Intermediate risk of EUGENIO: Yes 3 - 4  Low risk of EUGENIO: Yes 0 - 2      10/2022 Suzan NP   Was on Auto CPAP of 4-20 cmH2O pressure which is optimally controlling sleep apnea with apneic index (AHI) 1.0 events an hour.   Complaince download today reveals 13% of days with greater than 4 hours of device use.   Patient states he is now getting bills for CPAP since was not compliant with use and insurance medicaid [employer] is no longer paying for rental on machine or supplies.  He has complaint of does not like nasal pillows mask since strap falls off his head. He was told since not compliant will have to cover cost of machine. He states has not used in past month since thought if used would be billed for it.   Since off CPAP he feels tired and would like to continue CPAP with trial of Full face mask.  He states he was planning on turning in machine today, since can not afford $800  bill keep machine.   After long discuss with patient of his desire to continue CPAP, spoke with arabella via team messaging to determine if patient could try to have extension with [employer].     Team messaging from [facility]   [3:09 PM] Arabella Dickson  the insurance will not give an auth for a new mask. I would try giving them a donated or sample mask and give them 30 days to see if they can reach compliance    [3:09 PM] Arabella Dickson  after that if they didnt reach compliance they need to turn it in. Marko Krause will need a compliance download every 3 months to renew the auth to continue the rental so the patient needs to know that they need to remain compliant throughout    [3:10 PM] Arabella Dickson  even for supplies if they are not 70% [employer] will not pay for the supplies    Determined no donated or sample Full face mask available in [place] region. Before Arabella could check other locations for Full face mask donated, patient decided he did not want to keep machine and prove complaint every 30 days and he turned in CPAP machine to [facility].     After continued discussed determined patient has [facility] care access, provided patient Home Sleep Study results to take to [facility] if he desires.       3/21/2022, 3/23/2022 Home Sleep Study Mild sleep disordered breathing (AHI=11).    Compliance  Payor Standard  Usage 08/12/2022 - 09/25/2022  Usage days 30/45 days (67%)  >= 4 hours 6 days (13%)  < 4 hours 24 days (53%)  Usage hours 68 hours 2 minutes  Average usage (total days) 1 hours 31 minutes  Average usage (days used) 2 hours 16 minutes  Median usage (days used) 1 hours 38 minutes  Total used hours (value since last reset - 09/25/2022) 334 hours  AirSense 11 AutoSet  Serial number [id number]  Mode AutoSet  Min Pressure 4 cmH2O  Max Pressure 20 cmH2O  EPR Fulltime  EPR level 3  Response Standard  Therapy  Pressure - cmH2O Median: 4.1 95th percentile: 5.6 Maximum: 8.0  Leaks - L/min Median: 0.0 95th  percentile: 2.7 Maximum: 31.6  Events per hour AI: 0.9 HI: 0.1 AHI: 1.0  Apnea Index Central: 0.1 Obstructive: 0.5 Unknown: 0.3  RERA Index 0.0      Previous Report Reviewed: lab reports and office notes     Past Medical History: The following portions of the patient's history were reviewed and updated as appropriate:   He  has no past surgical history on file.  His family history includes Cancer in his paternal grandfather.  He  reports that he has quit smoking. He has quit using smokeless tobacco. He reports that he does not drink alcohol and does not use drugs.  He has a current medication list which includes the following prescription(s): lexapro, vyvanse, azithromycin, enlyte, and naproxen.  He has No Known Allergies.     The following portions of the patient's history were reviewed and updated as appropriate: allergies, current medications, past family history, past medical history, past social history, past surgical history and problem list.    Review of Systems   Constitutional:  Negative for fever, chills, weight loss, weight gain, activity change, appetite change, fatigue and night sweats.   HENT:  Negative for postnasal drip, rhinorrhea, sinus pressure, voice change and congestion.    Eyes:  Negative for redness and itching.   Respiratory:  Positive for apnea and snoring. Negative for cough, sputum production, chest tightness, shortness of breath, wheezing, orthopnea, asthma nighttime symptoms, dyspnea on extertion, use of rescue inhaler and somnolence.    Cardiovascular: Negative.  Negative for chest pain, palpitations and leg swelling.   Genitourinary:  Negative for difficulty urinating and hematuria.   Endocrine:  Negative for cold intolerance and heat intolerance.    Musculoskeletal:  Negative for arthralgias, gait problem, joint swelling and myalgias.   Skin: Negative.    Gastrointestinal:  Negative for nausea, vomiting, abdominal pain and acid reflux.   Neurological:  Negative for dizziness,  weakness, light-headedness and headaches.   Hematological:  Negative for adenopathy. No excessive bruising.   All other systems reviewed and are negative.   Objective:   /78   Pulse 96   Resp 20   Ht 6' (1.829 m)   Wt 123.8 kg (272 lb 13.1 oz)   SpO2 97%   BMI 37.00 kg/m²   Physical Exam  Vitals and nursing note reviewed.   Constitutional:       General: He is not in acute distress.     Appearance: He is well-developed. He is not ill-appearing or toxic-appearing.   HENT:      Head: Normocephalic and atraumatic.      Right Ear: External ear normal.      Left Ear: External ear normal.      Nose: Nose normal.      Mouth/Throat:      Pharynx: No oropharyngeal exudate.   Eyes:      Conjunctiva/sclera: Conjunctivae normal.   Cardiovascular:      Rate and Rhythm: Normal rate and regular rhythm.      Heart sounds: Normal heart sounds.   Pulmonary:      Effort: Pulmonary effort is normal.      Breath sounds: Normal breath sounds.   Abdominal:      Palpations: Abdomen is soft.   Musculoskeletal:      Cervical back: Normal range of motion and neck supple.   Skin:     General: Skin is warm and dry.   Neurological:      Mental Status: He is alert and oriented to person, place, and time.   Psychiatric:         Behavior: Behavior normal. Behavior is cooperative.         Thought Content: Thought content normal.         Judgment: Judgment normal.       Personal Diagnostic Review  CPAP download      X-Ray Chest PA And Lateral  Narrative: EXAMINATION:  XR CHEST PA AND LATERAL    CLINICAL HISTORY:  Pleural effusion, not elsewhere classified    TECHNIQUE:  PA and lateral views of the chest were performed.    COMPARISON:  4/25/22    FINDINGS:  The lungs are clear, with normal appearance of pulmonary vasculature and no pleural effusion or pneumothorax.    The cardiac silhouette is normal in size. The hilar and mediastinal contours are unremarkable.    Bones are intact.  Impression: No acute abnormality.    Electronically  signed by: Mike Bernal  Date:    10/05/2022  Time:    17:58        Assessment:     1. EUGENIO on CPAP    2. Class 2 severe obesity with serious comorbidity and body mass index (BMI) of 37.0 to 37.9 in adult, unspecified obesity type    3. Attention deficit hyperactivity disorder (ADHD), other type    4. Obstructive sleep apnea        Orders Placed This Encounter   Procedures    CPAP FOR HOME USE     3/21/2022, 3/23/2022 Home Sleep Study Mild sleep disordered breathing (AHI=11).   April 2023 Repeat Home Sleep Study denied by [employer].   4/14/2023 Peer review with Dr. Meneses with [facility]/[employer] insurance approved obtaining CPAP machine to treat obstructive sleep apnea without repeating sleep study. No weight loss, no oropharyngeal surgery, patient remains symptoms daytime sleepiness, feeling tired, restless sleep, witnessed apnea.     Order Specific Question:   Length of need (1-99 months):     Answer:   99     Order Specific Question:   Type ():     Answer:   Auto CPAP     Order Specific Question:   Auto CPAP pressure setting range (cmH20):     Answer:   4-10     Order Specific Question:   Fulfillment Priority:     Answer:   Level 4:  all others     Order Specific Question:   Humidification ():     Answer:   Heated     Order Specific Question:   Choose ONE mask type and its corresponding cushions and/or pillows:     Answer:    Full Face Mask, 1 per 90 days:  Full Face Cushion, (3 per 90 days)     Order Specific Question:   Choose EITHER Heated or Non-Heated Tubjing     Answer:    Non-Heated Tubing, 1 per 90 days     Order Specific Question:   Number of Days Needed:     Answer:   99     Order Specific Question:   All other supplies as needed as listed below:     Answer:    Headgear, 1 per 180 days     Order Specific Question:   All other supplies as needed as listed below:     Answer:    Chin Strap, 1 per 180 days     Order Specific Question:   All other supplies as  needed as listed below:     Answer:    Disposable Filter, 6 per 90 days     Order Specific Question:   All other supplies as needed as listed below:     Answer:    Non-Disposable Filter, 1 per 180 days     Order Specific Question:   All other supplies as needed as listed below:     Answer:    Humidifier Chamber, 1 per 180 days    Home Sleep Study     Standing Status:   Future     Standing Expiration Date:   4/3/2024       Plan:     Problem List Items Addressed This Visit       Obstructive sleep apnea     3/21/2022, 3/23/2022 Home Sleep Study Mild sleep disordered breathing (AHI=11).  Setup Date: 05/09/2022  [facility] AutoPAP  [facility]  10/5/2022 patient turned in CPAP machine AMA related to non compliance/difficulty with mask.  [facility].   4/3/2023 patient presents re-evaluation symptomatic obstructive sleep apnea, daytime sleepiness, feeling tired. Awakens feeling tired.   Proceed with 1 night Home Sleep Study.  4/2023 repeat Home Sleep Study denied by [employer].   4/14/2023 Addendum: 4/14/2023 Peer review with Dr. Meneses with [facility]/[employer] insurance approved obtaining CPAP machine to treat obstructive sleep apnea without repeating sleep study. No weight loss, no oropharyngeal surgery, patient remains symptoms daytime sleepiness, feeling tired, restless sleep, witnessed apnea.  4/14/2023 order Auto CPAP 4-10 cm   Full face mask  [facility]: [facility]  Follow up 31-90 days from obtaining PAP therapy for IDL.     Stressed Goals for positive airway pressure therapy. Ideal is usage 100% of nights for 6 - 8 hours per night. Minimum usage is 70% of night for at least 4 hours per night used. Patient states understanding              Relevant Orders    CPAP FOR HOME USE    Class 2 severe obesity with serious comorbidity and body mass index (BMI) of 37.0 to 37.9 in adult     Encouraged calorie reduction and 30 minutes of exercise daily. Discussed impact of obesity on  general health.  Wt Readings from Last 9 Encounters:   04/03/23 123.8 kg (272 lb 13.1 oz)   10/05/22 119.8 kg (264 lb 1.8 oz)   07/11/22 119.6 kg (263 lb 9 oz)   05/24/22 120.1 kg (264 lb 12.4 oz)   05/05/22 120.1 kg (264 lb 12.4 oz)   04/25/22 125.5 kg (276 lb 10.8 oz)   03/31/22 124 kg (273 lb 5.9 oz)   03/10/22 122 kg (268 lb 15.4 oz)   02/11/22 122.7 kg (270 lb 8.1 oz)   Body mass index is 37 kg/m².             Relevant Orders    Home Sleep Study    ADHD (attention deficit hyperactivity disorder)     Controlled on vyvanse as needed                Follow up in 10 weeks (on 6/12/2023) for CPAP compliance download after initial set up.    CC: Radha with [facility], regarding approval for CPAP without repeat Home Sleep Study.

## 2023-04-03 NOTE — ASSESSMENT & PLAN NOTE
3/21/2022, 3/23/2022 Home Sleep Study Mild sleep disordered breathing (AHI=11).  Setup Date: 05/09/2022  [facility] AutoPAP  [facility]  10/5/2022 patient turned in CPAP machine AMA related to non compliance/difficulty with mask.  [facility].   4/3/2023 patient presents re-evaluation symptomatic obstructive sleep apnea, daytime sleepiness, feeling tired. Awakens feeling tired.   Proceed with 1 night Home Sleep Study.  4/2023 repeat Home Sleep Study denied by [employer].   4/14/2023 Addendum: 4/14/2023 Peer review with Dr. Meneses with [facility]/[employer] insurance approved obtaining CPAP machine to treat obstructive sleep apnea without repeating sleep study. No weight loss, no oropharyngeal surgery, patient remains symptoms daytime sleepiness, feeling tired, restless sleep, witnessed apnea.  4/14/2023 order Auto CPAP 4-10 cm   Full face mask  H[facility]: [facility]  Follow up 31-90 days from obtaining PAP therapy for IDL.     Stressed Goals for positive airway pressure therapy. Ideal is usage 100% of nights for 6 - 8 hours per night. Minimum usage is 70% of night for at least 4 hours per night used. Patient states understanding

## 2023-04-03 NOTE — ASSESSMENT & PLAN NOTE
Encouraged calorie reduction and 30 minutes of exercise daily. Discussed impact of obesity on general health.  Wt Readings from Last 9 Encounters:   04/03/23 123.8 kg (272 lb 13.1 oz)   10/05/22 119.8 kg (264 lb 1.8 oz)   07/11/22 119.6 kg (263 lb 9 oz)   05/24/22 120.1 kg (264 lb 12.4 oz)   05/05/22 120.1 kg (264 lb 12.4 oz)   04/25/22 125.5 kg (276 lb 10.8 oz)   03/31/22 124 kg (273 lb 5.9 oz)   03/10/22 122 kg (268 lb 15.4 oz)   02/11/22 122.7 kg (270 lb 8.1 oz)   Body mass index is 37 kg/m².

## 2023-04-04 ENCOUNTER — TELEPHONE (OUTPATIENT)
Dept: PULMONOLOGY | Facility: CLINIC | Age: 23
End: 2023-04-04
Payer: MEDICAID

## 2023-04-13 ENCOUNTER — PATIENT MESSAGE (OUTPATIENT)
Dept: PULMONOLOGY | Facility: CLINIC | Age: 23
End: 2023-04-13
Payer: MEDICAID

## 2023-04-14 ENCOUNTER — TELEPHONE (OUTPATIENT)
Dept: PULMONOLOGY | Facility: CLINIC | Age: 23
End: 2023-04-14
Payer: MEDICAID

## 2023-05-01 ENCOUNTER — TELEPHONE (OUTPATIENT)
Dept: PULMONOLOGY | Facility: CLINIC | Age: 23
End: 2023-05-01
Payer: MEDICAID

## 2023-05-01 NOTE — TELEPHONE ENCOUNTER
Returned pt phone call , pt wanted to reschedule andrade to  CPAP machine, No answer left vm pt should call [place] [facility] [place] [phone]. Or Samia at [phone]. This is the providers office and we are not allowed to schedule on their schedule pt must call them to reschedule missed andrade.

## 2023-07-06 ENCOUNTER — TELEPHONE (OUTPATIENT)
Dept: PULMONOLOGY | Facility: CLINIC | Age: 23
End: 2023-07-06
Payer: MEDICAID

## 2023-07-07 ENCOUNTER — TELEPHONE (OUTPATIENT)
Dept: PULMONOLOGY | Facility: CLINIC | Age: 23
End: 2023-07-07
Payer: MEDICAID

## 2023-11-14 ENCOUNTER — TELEPHONE (OUTPATIENT)
Dept: PULMONOLOGY | Facility: CLINIC | Age: 23
End: 2023-11-14
Payer: MEDICAID

## 2023-11-14 NOTE — TELEPHONE ENCOUNTER
----- Message from Jeanette Laws sent at 11/14/2023  2:25 PM CST -----  Regarding: cpap  Name of who is calling:   Maciel      What is the request in detail: Pt is requesting a call back in ref to returning cpap machine Where to return it      Can the clinic reply by [facility]:no      What number to call back if not [facility]: [phone]

## 2024-04-03 ENCOUNTER — PATIENT MESSAGE (OUTPATIENT)
Dept: PULMONOLOGY | Facility: CLINIC | Age: 24
End: 2024-04-03
Payer: MEDICAID

## 2025-02-07 NOTE — DISCHARGE INSTRUCTIONS
ERP at bedside   Take 1000 mg of Acetaminophen (Tylenol) with 600 mg of Ibuprofen (Advil) every 8 hours for 7 days.